# Patient Record
Sex: MALE | Race: WHITE | NOT HISPANIC OR LATINO | URBAN - METROPOLITAN AREA
[De-identification: names, ages, dates, MRNs, and addresses within clinical notes are randomized per-mention and may not be internally consistent; named-entity substitution may affect disease eponyms.]

---

## 2017-09-26 ENCOUNTER — EMERGENCY (EMERGENCY)
Facility: HOSPITAL | Age: 32
LOS: 0 days | Discharge: HOME | End: 2017-09-27

## 2017-09-26 DIAGNOSIS — R10.9 UNSPECIFIED ABDOMINAL PAIN: ICD-10-CM

## 2017-09-26 DIAGNOSIS — N13.30 UNSPECIFIED HYDRONEPHROSIS: ICD-10-CM

## 2017-09-26 DIAGNOSIS — N20.1 CALCULUS OF URETER: ICD-10-CM

## 2017-12-07 ENCOUNTER — EMERGENCY (EMERGENCY)
Facility: HOSPITAL | Age: 32
LOS: 0 days | Discharge: HOME | End: 2017-12-07
Admitting: INTERNAL MEDICINE

## 2017-12-07 DIAGNOSIS — R05 COUGH: ICD-10-CM

## 2018-07-11 ENCOUNTER — INPATIENT (INPATIENT)
Facility: HOSPITAL | Age: 33
LOS: 1 days | Discharge: HOME | End: 2018-07-13
Attending: INTERNAL MEDICINE | Admitting: INTERNAL MEDICINE
Payer: COMMERCIAL

## 2018-07-11 VITALS
RESPIRATION RATE: 20 BRPM | WEIGHT: 289.91 LBS | TEMPERATURE: 101 F | SYSTOLIC BLOOD PRESSURE: 153 MMHG | HEART RATE: 119 BPM | DIASTOLIC BLOOD PRESSURE: 85 MMHG | OXYGEN SATURATION: 96 %

## 2018-07-11 LAB
ANION GAP SERPL CALC-SCNC: 12 MMOL/L — SIGNIFICANT CHANGE UP (ref 7–14)
BASOPHILS # BLD AUTO: 0.03 K/UL — SIGNIFICANT CHANGE UP (ref 0–0.2)
BASOPHILS NFR BLD AUTO: 0.3 % — SIGNIFICANT CHANGE UP (ref 0–1)
BUN SERPL-MCNC: 13 MG/DL — SIGNIFICANT CHANGE UP (ref 10–20)
CALCIUM SERPL-MCNC: 8.4 MG/DL — LOW (ref 8.5–10.1)
CHLORIDE SERPL-SCNC: 106 MMOL/L — SIGNIFICANT CHANGE UP (ref 98–110)
CO2 SERPL-SCNC: 22 MMOL/L — SIGNIFICANT CHANGE UP (ref 17–32)
CREAT SERPL-MCNC: 1.1 MG/DL — SIGNIFICANT CHANGE UP (ref 0.7–1.5)
EOSINOPHIL # BLD AUTO: 0.07 K/UL — SIGNIFICANT CHANGE UP (ref 0–0.7)
EOSINOPHIL NFR BLD AUTO: 0.6 % — SIGNIFICANT CHANGE UP (ref 0–8)
GAS PNL BLDV: SIGNIFICANT CHANGE UP
GLUCOSE SERPL-MCNC: 105 MG/DL — HIGH (ref 70–99)
HCT VFR BLD CALC: 46.2 % — SIGNIFICANT CHANGE UP (ref 42–52)
HGB BLD-MCNC: 15.3 G/DL — SIGNIFICANT CHANGE UP (ref 14–18)
IMM GRANULOCYTES NFR BLD AUTO: 0.6 % — HIGH (ref 0.1–0.3)
LYMPHOCYTES # BLD AUTO: 1.81 K/UL — SIGNIFICANT CHANGE UP (ref 1.2–3.4)
LYMPHOCYTES # BLD AUTO: 15.8 % — LOW (ref 20.5–51.1)
MCHC RBC-ENTMCNC: 27 PG — SIGNIFICANT CHANGE UP (ref 27–31)
MCHC RBC-ENTMCNC: 33.1 G/DL — SIGNIFICANT CHANGE UP (ref 32–37)
MCV RBC AUTO: 81.5 FL — SIGNIFICANT CHANGE UP (ref 80–94)
MONOCYTES # BLD AUTO: 2.43 K/UL — HIGH (ref 0.1–0.6)
MONOCYTES NFR BLD AUTO: 21.3 % — HIGH (ref 1.7–9.3)
NEUTROPHILS # BLD AUTO: 7.02 K/UL — HIGH (ref 1.4–6.5)
NEUTROPHILS NFR BLD AUTO: 61.4 % — SIGNIFICANT CHANGE UP (ref 42.2–75.2)
PLATELET # BLD AUTO: 150 K/UL — SIGNIFICANT CHANGE UP (ref 130–400)
POTASSIUM SERPL-MCNC: 3.6 MMOL/L — SIGNIFICANT CHANGE UP (ref 3.5–5)
POTASSIUM SERPL-SCNC: 3.6 MMOL/L — SIGNIFICANT CHANGE UP (ref 3.5–5)
RBC # BLD: 5.67 M/UL — SIGNIFICANT CHANGE UP (ref 4.7–6.1)
RBC # FLD: 13.1 % — SIGNIFICANT CHANGE UP (ref 11.5–14.5)
SODIUM SERPL-SCNC: 140 MMOL/L — SIGNIFICANT CHANGE UP (ref 135–146)
WBC # BLD: 11.43 K/UL — HIGH (ref 4.8–10.8)
WBC # FLD AUTO: 11.43 K/UL — HIGH (ref 4.8–10.8)

## 2018-07-11 RX ORDER — HYDROMORPHONE HYDROCHLORIDE 2 MG/ML
2 INJECTION INTRAMUSCULAR; INTRAVENOUS; SUBCUTANEOUS ONCE
Qty: 0 | Refills: 0 | Status: DISCONTINUED | OUTPATIENT
Start: 2018-07-11 | End: 2018-07-11

## 2018-07-11 RX ORDER — OXYCODONE HYDROCHLORIDE 5 MG/1
5 TABLET ORAL ONCE
Qty: 0 | Refills: 0 | Status: DISCONTINUED | OUTPATIENT
Start: 2018-07-11 | End: 2018-07-11

## 2018-07-11 RX ORDER — ACETAMINOPHEN 500 MG
650 TABLET ORAL ONCE
Qty: 0 | Refills: 0 | Status: COMPLETED | OUTPATIENT
Start: 2018-07-11 | End: 2018-07-11

## 2018-07-11 RX ORDER — OXYCODONE AND ACETAMINOPHEN 5; 325 MG/1; MG/1
1 TABLET ORAL EVERY 4 HOURS
Qty: 0 | Refills: 0 | Status: DISCONTINUED | OUTPATIENT
Start: 2018-07-11 | End: 2018-07-12

## 2018-07-11 RX ORDER — ACETAMINOPHEN 500 MG
650 TABLET ORAL EVERY 6 HOURS
Qty: 0 | Refills: 0 | Status: DISCONTINUED | OUTPATIENT
Start: 2018-07-11 | End: 2018-07-12

## 2018-07-11 RX ORDER — CIPROFLOXACIN LACTATE 400MG/40ML
400 VIAL (ML) INTRAVENOUS ONCE
Qty: 0 | Refills: 0 | Status: COMPLETED | OUTPATIENT
Start: 2018-07-11 | End: 2018-07-11

## 2018-07-11 RX ORDER — CIPROFLOXACIN HCL 0.3 %
1 DROPS OPHTHALMIC (EYE) ONCE
Qty: 0 | Refills: 0 | Status: COMPLETED | OUTPATIENT
Start: 2018-07-11 | End: 2018-07-11

## 2018-07-11 RX ORDER — HYDROMORPHONE HYDROCHLORIDE 2 MG/ML
0.5 INJECTION INTRAMUSCULAR; INTRAVENOUS; SUBCUTANEOUS ONCE
Qty: 0 | Refills: 0 | Status: DISCONTINUED | OUTPATIENT
Start: 2018-07-11 | End: 2018-07-11

## 2018-07-11 RX ORDER — ACETAMINOPHEN 500 MG
650 TABLET ORAL EVERY 4 HOURS
Qty: 0 | Refills: 0 | Status: DISCONTINUED | OUTPATIENT
Start: 2018-07-11 | End: 2018-07-11

## 2018-07-11 RX ORDER — KETOROLAC TROMETHAMINE 30 MG/ML
15 SYRINGE (ML) INJECTION ONCE
Qty: 0 | Refills: 0 | Status: DISCONTINUED | OUTPATIENT
Start: 2018-07-11 | End: 2018-07-11

## 2018-07-11 RX ORDER — CEFEPIME 1 G/1
2000 INJECTION, POWDER, FOR SOLUTION INTRAMUSCULAR; INTRAVENOUS EVERY 12 HOURS
Qty: 0 | Refills: 0 | Status: DISCONTINUED | OUTPATIENT
Start: 2018-07-11 | End: 2018-07-12

## 2018-07-11 RX ORDER — OXYCODONE AND ACETAMINOPHEN 5; 325 MG/1; MG/1
1 TABLET ORAL ONCE
Qty: 0 | Refills: 0 | Status: DISCONTINUED | OUTPATIENT
Start: 2018-07-11 | End: 2018-07-11

## 2018-07-11 RX ORDER — SODIUM CHLORIDE 9 MG/ML
1000 INJECTION INTRAMUSCULAR; INTRAVENOUS; SUBCUTANEOUS ONCE
Qty: 0 | Refills: 0 | Status: COMPLETED | OUTPATIENT
Start: 2018-07-11 | End: 2018-07-11

## 2018-07-11 RX ADMIN — Medication 200 MILLIGRAM(S): at 05:26

## 2018-07-11 RX ADMIN — OXYCODONE HYDROCHLORIDE 5 MILLIGRAM(S): 5 TABLET ORAL at 18:36

## 2018-07-11 RX ADMIN — OXYCODONE HYDROCHLORIDE 5 MILLIGRAM(S): 5 TABLET ORAL at 19:00

## 2018-07-11 RX ADMIN — OXYCODONE AND ACETAMINOPHEN 1 TABLET(S): 5; 325 TABLET ORAL at 06:39

## 2018-07-11 RX ADMIN — OXYCODONE AND ACETAMINOPHEN 1 TABLET(S): 5; 325 TABLET ORAL at 15:53

## 2018-07-11 RX ADMIN — Medication 15 MILLIGRAM(S): at 16:53

## 2018-07-11 RX ADMIN — CEFEPIME 100 MILLIGRAM(S): 1 INJECTION, POWDER, FOR SOLUTION INTRAMUSCULAR; INTRAVENOUS at 18:00

## 2018-07-11 RX ADMIN — OXYCODONE AND ACETAMINOPHEN 1 TABLET(S): 5; 325 TABLET ORAL at 18:24

## 2018-07-11 RX ADMIN — SODIUM CHLORIDE 1000 MILLILITER(S): 9 INJECTION INTRAMUSCULAR; INTRAVENOUS; SUBCUTANEOUS at 03:34

## 2018-07-11 RX ADMIN — OXYCODONE AND ACETAMINOPHEN 1 TABLET(S): 5; 325 TABLET ORAL at 16:52

## 2018-07-11 RX ADMIN — Medication 650 MILLIGRAM(S): at 14:30

## 2018-07-11 RX ADMIN — Medication 650 MILLIGRAM(S): at 23:00

## 2018-07-11 RX ADMIN — Medication 650 MILLIGRAM(S): at 15:00

## 2018-07-11 RX ADMIN — Medication 15 MILLIGRAM(S): at 05:18

## 2018-07-11 RX ADMIN — HYDROMORPHONE HYDROCHLORIDE 0.5 MILLIGRAM(S): 2 INJECTION INTRAMUSCULAR; INTRAVENOUS; SUBCUTANEOUS at 23:54

## 2018-07-11 RX ADMIN — Medication 1 DROP(S): at 14:39

## 2018-07-11 RX ADMIN — Medication 15 MILLIGRAM(S): at 03:43

## 2018-07-11 NOTE — H&P ADULT - NSHPSOCIALHISTORY_GEN_ALL_CORE
Marital Status:  (   )    (  x ) Single    (   )    (  )   Occupation:   Lives with: (  ) alone  (  ) children   (  ) spouse   ( x ) parents  (  ) other  Recent Travel: None    Substance Use (street drugs): ( x ) never used  (  ) other:  Tobacco Usage:  (  x ) never smoked   (   ) former smoker   (   ) current smoker  (     ) pack year  Alcohol Usage: Denied

## 2018-07-11 NOTE — ED PROVIDER NOTE - PHYSICAL EXAMINATION
Vital Signs: I have reviewed the initial vital signs.  Constitutional: NAD, well-nourished, appears stated age, no acute distress.  Head: NCAT  Eyes: Anicteric  ENT: + right ear with edema/ttp of the uricle. TEENT: Airway patent, moist MM, no erythema/swelling/deformity of oral structures. EOMI, PERRLA.  CV: regular rate, regular rhythm, well-perfused extremities, 2+ b/l DP and radial pulses equal.  Lungs: BCTA, no increased WOB.  ABD: NTND, no guarding or rebound, no pulsatile mass, no hernias.   MSK: Neck supple, nontender, nl ROM, no stepoff. Chest nontender. Back nontender in TLS spine or to b/l bony structures or flanks. Ext nontender, nl rom, no deformity.   INTEG: Skin warm, dry, no rash.  NEURO: A&Ox3, CN II-XII intact, normal strength 5/5 all 4 ext, nl sensation throughout, normal speech and coordination.  PSYCH: Calm, cooperative, normal affect and interaction. Vital Signs: I have reviewed the initial vital signs.  Constitutional: NAD, well-nourished, appears stated age, no acute distress.  Head: NCAT  Eyes: Anicteric  ENT: + right ear with edema/ttp of the auricle, tragi, mastoid. Edemetous external canal. Unable to visualized TM 2/2 edemetous canal. Left ear WNL. MMM  CV: RRR  Lungs: Unlabored respirations  ABD: ND  MSK: Neck supple. No rigidity  INTEG: Skin warm, dry, no rash.  NEURO: A&Ox3, CN II-XII intact, normal strength 5/5 all 4 ext, nl sensation throughout, normal speech and coordination.  PSYCH: Calm, cooperative, normal affect and interaction.

## 2018-07-11 NOTE — ED ADULT NURSE NOTE - CHIEF COMPLAINT QUOTE
I was at [facility] yesterday for an ear infection, the pain is bad and the Abx isn't doing anything - patient

## 2018-07-11 NOTE — ED ADULT NURSE REASSESSMENT NOTE - NS ED NURSE REASSESS COMMENT FT1
Pt presented to R ear pain and fever. He was seen in the [facility] but the fever started last night. Pt states the pain radiates to his R side of his face. Labs were done and pt was given pain medication

## 2018-07-11 NOTE — H&P ADULT - HISTORY OF PRESENT ILLNESS
32M with no sig pmhx presents from home for worsening right sided ear pain, drainage, and swelling. Patient has noticed his ear has been itching for months, was using a q-tip to scratch it. 3 days ago patient was seen at [facility] for similar complaints and was diagnosed with otitis externa and given Augmentin, Cipro otic drops, and toradol for pain. Patient was compliant with medications at home but continue to experience significant pain, swelling, and otic discharge (brown) and so came back in the ED. Patient reports tmax at home of 103.8. Endorses swimming in his pool. No SOB, CP, Confusion, neck pain, n/v/d.   In ED CT scan revealed R sided otitis externa with  otomastoiditis w/o evidence of erosion or coalescence. Given Cipor otic drop and Cipro IV

## 2018-07-11 NOTE — ED PROVIDER NOTE - NS ED ROS FT
Constitutional: See HPI  Eyes/ENT: See HPI  Cardiovascular: (-) chest pain, (-) syncope  Respiratory: (-) cough  Gastrointestinal: (-) n/v  Neurological: (-) headache Constitutional: See HPI  Eyes/ENT: See HPI  Cardiovascular: (-) chest pain, (-) syncope  Respiratory: (-) cough  Gastrointestinal: (-) n/v  Neurological: (-) headache  ROS negative other than as documented in HPI

## 2018-07-11 NOTE — ED ADULT TRIAGE NOTE - CHIEF COMPLAINT QUOTE
I was at [facility] yesterday for an ear infection, the pain is bad and the Abx isn't doing anything - patient

## 2018-07-11 NOTE — H&P ADULT - NSHPPHYSICALEXAM_GEN_ALL_CORE
Vital Signs Last 24 Hrs  T(C): 36.6 (11 Jul 2018 08:10), Max: 38.5 (11 Jul 2018 02:15)  T(F): 97.9 (11 Jul 2018 08:10), Max: 101.3 (11 Jul 2018 02:15)  HR: 79 (11 Jul 2018 08:10) (79 - 119)  BP: 127/60 (11 Jul 2018 08:10) (110/57 - 153/85)  RR: 18 (11 Jul 2018 08:10) (18 - 20)  SpO2: 97% (11 Jul 2018 08:10) (96% - 97%)      GENERAL: Mild Distress, Non-toxic, stated age   HEAD:  R ear swelling with purulent discharge, otic wick in place, swelling anteriorly to the ear s noted. Minimal mastoid tenderness .   EYES: EOMI, PERRLA, conjunctiva and sclera clear  NECK: Supple, R submandibular LAD noted, tenderness improved   CHEST/LUNG: Clear to auscultation bilaterally; No wheezing, rhonchi, or crackles  HEART: Regular rate and rhythm; s1, s2, No murmurs, rubs, or gallops  ABDOMEN: Soft, Nontender, Nondistended; Bowel sounds present, No rebound or guarding noted   EXTREMITIES:  No lower extremity edema or calf tenderness to palpation.  No clubbing or cyanosis  PSYCH: AAOx3  NEUROLOGY: non-focal  SKIN: small petichial rash noted on Right ankle

## 2018-07-11 NOTE — H&P ADULT - ASSESSMENT
32M with no sig pmhx presents from home for worsening right sided ear pain, drainage, and swelling. Found to have otitis externa and mastoiditis     Otitis Externa and Mastoiditis:   Will start Cefepime for psudeomonal coverage (swimming in pool and small ankle rash)  ID Consult  ENT consult  Toradol and APAP for pain control     GI ppx: Not indicated  DVT ppx: Low risk, patient can ambulate well and is young  Full Code  Regular diet 32M with no sig pmhx presents from home for worsening right sided ear pain, drainage, and swelling. Found to have otitis externa and mastoiditis     Otitis Externa and Mastoiditis:   Will start Cefepime for psudeomonal coverage (swimming in pool and small ankle rash)  ID Consult  ENT consult  f/u blood and ear culture  Toradol and APAP for pain control     GI ppx: Not indicated  DVT ppx: Low risk, patient can ambulate well and is young  Full Code  Regular diet

## 2018-07-11 NOTE — H&P ADULT - NSHPLABSRESULTS_GEN_ALL_CORE
15.3   11.43 )-----------( 150      ( 11 Jul 2018 03:00 )             46.2       07-11    140  |  106  |  13  ----------------------------<  105<H>  3.6   |  22  |  1.1    Ca    8.4<L>      11 Jul 2018 03:52

## 2018-07-11 NOTE — ED PROVIDER NOTE - PROGRESS NOTE DETAILS
Labs reassuring. Pain controlled after percocet. Patient given cipro for malignant otitis externa. Ear wick placed. Patient signed out to day team to f/u CT results. If CT without mastoiditis, will like discharge patient on cipro 750mg BID x 10 days for CLARK. Patient has f/u with ENT today endorsed to Dr Perez, pending CT scan and dispo. Pt signed out to me by VETO Cormier. Will f/u CT scan read. Will advise pt to continue Cipro drops and will give Cipro PO. Pt had ear wick placed in ED. Spoke w/ MAR. Requesting ENT consult. ENT paged, awaiting callback. Will admit pt to medicine for IV abx. Pt agreeable to plan. PMD is [facility] group. Dr. Ashley called back from [facility]'s group - accepts admission. Recommending ENT & ID consults.

## 2018-07-11 NOTE — ED PROVIDER NOTE - OBJECTIVE STATEMENT
33 y/o M with no significant PMH who presents with worsening right ear pain. He has associated fevers to 103.8 and purulent drainage. He was seen at [facility] where he was given toradol and discharged with augmentin, cipro otic, and tylenol. Denies HA, dizziness, DM, other URI sxs, rash, trauma, other complaints  He has f/u with ENT today.

## 2018-07-11 NOTE — ED PROVIDER NOTE - ATTENDING CONTRIBUTION TO CARE
33 yo male with no PMH presents to the ER 31 yo male with no PMH presents to the ER for right ear pain 31 yo male with no PMH presents to the ER for right ear pain x 2 days which pt states is worsening. Pt states he was placed on Cipro otic and augmentin but the swelling to left is worsening. No wick placed in the ear at the time he was in seen 2 days ago at  [facility]. Pt states he has high fevers up to 103.8 with drainage, and pain that is spreading from canal to pinna with some tenderness over mastoid bone on PA evaluation. NO neck stiffness, no HA, no vomiting, no diarrhea, no other complaints. Will check labs, CT and place wick into ear canal for cipro otic. Will also give dose of CIpro IV in ER to cover for malignant otitis externa. To reassess. 31 yo male with no PMH presents to the ER for right ear pain x 2 days which pt states is worsening. Pt states he was placed on Cipro otic and augmentin but the swelling to left is worsening. No wick placed in the ear at the time he was in seen 2 days ago at  [facility]. Pt states he has high fevers up to 103.8 with drainage, and pain that is spreading from canal to pinna with some tenderness over mastoid bone on PA evaluation. NO neck stiffness, no HA, no vomiting, no diarrhea, no other complaints. Will check labs, CT and place wick into ear canal for cipro otic. Will also give dose of CIpro IV in ER to cover for malignant otitis externa. To reassess.    Dr. Perez @09:00 - Patient's CT read as acute mastoiditis. Will start IV abx, admit for IV abx and ENT evaluation.

## 2018-07-11 NOTE — H&P ADULT - ATTENDING COMMENTS
Pt seen and examined, I have read and agree with above exam and poa,   c/o pain. discharge r ear  Assessment:  · Assessment    32M with no sig pmhx presents from home for worsening right sided ear pain, drainage, and swelling. Found to have otitis externa and mastoiditis     Otitis Externa and Mastoiditis:   Will start Cefepime for psudeomonal coverage (swimming in pool and small ankle rash)  ID Consult  ENT consult  f/u blood and ear culture  Toradol and APAP for pain control     GI ppx: Not indicated  DVT ppx: Low risk, patient can ambulate well and is young  Full Code  Regular diet

## 2018-07-12 DIAGNOSIS — S83.519A SPRAIN OF ANTERIOR CRUCIATE LIGAMENT OF UNSPECIFIED KNEE, INITIAL ENCOUNTER: Chronic | ICD-10-CM

## 2018-07-12 LAB
ANION GAP SERPL CALC-SCNC: 15 MMOL/L — HIGH (ref 7–14)
BUN SERPL-MCNC: 19 MG/DL — SIGNIFICANT CHANGE UP (ref 10–20)
CALCIUM SERPL-MCNC: 9.1 MG/DL — SIGNIFICANT CHANGE UP (ref 8.5–10.1)
CHLORIDE SERPL-SCNC: 105 MMOL/L — SIGNIFICANT CHANGE UP (ref 98–110)
CO2 SERPL-SCNC: 25 MMOL/L — SIGNIFICANT CHANGE UP (ref 17–32)
CREAT SERPL-MCNC: 1.1 MG/DL — SIGNIFICANT CHANGE UP (ref 0.7–1.5)
GLUCOSE SERPL-MCNC: 100 MG/DL — HIGH (ref 70–99)
MAGNESIUM SERPL-MCNC: 2.2 MG/DL — SIGNIFICANT CHANGE UP (ref 1.8–2.4)
POTASSIUM SERPL-MCNC: 4.1 MMOL/L — SIGNIFICANT CHANGE UP (ref 3.5–5)
POTASSIUM SERPL-SCNC: 4.1 MMOL/L — SIGNIFICANT CHANGE UP (ref 3.5–5)
SODIUM SERPL-SCNC: 145 MMOL/L — SIGNIFICANT CHANGE UP (ref 135–146)

## 2018-07-12 PROCEDURE — 99222 1ST HOSP IP/OBS MODERATE 55: CPT

## 2018-07-12 RX ORDER — CEFEPIME 1 G/1
2000 INJECTION, POWDER, FOR SOLUTION INTRAMUSCULAR; INTRAVENOUS EVERY 8 HOURS
Qty: 0 | Refills: 0 | Status: DISCONTINUED | OUTPATIENT
Start: 2018-07-12 | End: 2018-07-13

## 2018-07-12 RX ORDER — PANTOPRAZOLE SODIUM 20 MG/1
40 TABLET, DELAYED RELEASE ORAL EVERY 24 HOURS
Qty: 0 | Refills: 0 | Status: DISCONTINUED | OUTPATIENT
Start: 2018-07-12 | End: 2018-07-13

## 2018-07-12 RX ORDER — OXYCODONE AND ACETAMINOPHEN 5; 325 MG/1; MG/1
1 TABLET ORAL EVERY 6 HOURS
Qty: 0 | Refills: 0 | Status: DISCONTINUED | OUTPATIENT
Start: 2018-07-12 | End: 2018-07-13

## 2018-07-12 RX ADMIN — CEFEPIME 100 MILLIGRAM(S): 1 INJECTION, POWDER, FOR SOLUTION INTRAMUSCULAR; INTRAVENOUS at 06:15

## 2018-07-12 RX ADMIN — CEFEPIME 100 MILLIGRAM(S): 1 INJECTION, POWDER, FOR SOLUTION INTRAMUSCULAR; INTRAVENOUS at 21:59

## 2018-07-12 RX ADMIN — Medication 500 MILLIGRAM(S): at 18:00

## 2018-07-12 RX ADMIN — OXYCODONE AND ACETAMINOPHEN 1 TABLET(S): 5; 325 TABLET ORAL at 15:28

## 2018-07-12 RX ADMIN — Medication 40 MILLIGRAM(S): at 17:18

## 2018-07-12 RX ADMIN — PANTOPRAZOLE SODIUM 40 MILLIGRAM(S): 20 TABLET, DELAYED RELEASE ORAL at 14:05

## 2018-07-12 RX ADMIN — HYDROMORPHONE HYDROCHLORIDE 0.5 MILLIGRAM(S): 2 INJECTION INTRAMUSCULAR; INTRAVENOUS; SUBCUTANEOUS at 00:47

## 2018-07-12 RX ADMIN — Medication 500 MILLIGRAM(S): at 17:18

## 2018-07-12 RX ADMIN — OXYCODONE AND ACETAMINOPHEN 1 TABLET(S): 5; 325 TABLET ORAL at 06:17

## 2018-07-12 RX ADMIN — OXYCODONE AND ACETAMINOPHEN 1 TABLET(S): 5; 325 TABLET ORAL at 14:30

## 2018-07-12 RX ADMIN — OXYCODONE AND ACETAMINOPHEN 1 TABLET(S): 5; 325 TABLET ORAL at 07:16

## 2018-07-12 RX ADMIN — Medication 650 MILLIGRAM(S): at 00:47

## 2018-07-12 NOTE — CONSULT NOTE ADULT - ENMT COMMENTS
Right otitis, no otorrhea. No mastoid pain. R ear canal could not be visualised. Mild erythema of ear. No tenderness. R mastoid with no pain, edema/ erythema/ induration.

## 2018-07-12 NOTE — CONSULT NOTE ADULT - SUBJECTIVE AND OBJECTIVE BOX
ENT DAILY PROGRESS NOTE    Overnight events/Interval HPI: HPI:  32M with no sig pmhx presents from home for worsening right sided ear pain, drainage, and swelling. Patient has noticed his ear has been itching for months, was using a q-tip to scratch it. 3 days ago patient was seen at [facility] for similar complaints and was diagnosed with otitis externa and given Augmentin, Cipro otic drops, and toradol for pain. Patient was compliant with medications at home but continue to experience significant pain, swelling, and otic discharge (brown) and so came back in the ED. Patient reports tmax at home of 103.8. Endorses swimming in his pool. No SOB, CP, Confusion, neck pain, n/v/d.   In ED CT scan revealed R sided otitis externa with  otomastoiditis w/o evidence of erosion or coalescence. Given Cipor otic drop and Cipro IV (11 Jul 2018 11:59)  Ear wick in place. pt still experiencing pain. + yellow discharge from R ear. TM not visualized due to edema.        Allergies    morphine (Rash)    Intolerances        MEDICATIONS:  Antiinfectives:   cefepime   IVPB 2000 milliGRAM(s) IV Intermittent every 12 hours    IV fluids:    Hematologic/Anticoagulation:    Pain medications/Neuro:  acetaminophen   Tablet. 650 milliGRAM(s) Oral every 4 hours PRN    Endocrine Medications:     All other standing medications:   ciprofloxacin  0.3% Otic Solution 1 Drop(s) Right Ear Once    All other PRN medications:      Vital Signs Last 24 Hrs  T(C): 36.6 (11 Jul 2018 08:10), Max: 38.5 (11 Jul 2018 02:15)  T(F): 97.9 (11 Jul 2018 08:10), Max: 101.3 (11 Jul 2018 02:15)  HR: 79 (11 Jul 2018 08:10) (79 - 119)  BP: 127/60 (11 Jul 2018 08:10) (110/57 - 153/85)  BP(mean): --  RR: 18 (11 Jul 2018 08:10) (18 - 20)  SpO2: 97% (11 Jul 2018 08:10) (96% - 97%)          PHYSICAL EXAM:    ENT EXAM-   Constitutional: Well-developed, well-nourished.  No hoarseness.   awake/alert  Head:  normocephalic, atraumatic.   Ears:  L eac clear, no edema no erythema. R pinna edematous and TTP. EAC edematous, R TM not visualized due to edema and ear wick in place. No mastoid tenderness.  Nose:  Septum intact, midline, deviated.  Inferior turbinates normal bilateral  OC/OP:  Floor of mouth, buccal mucosa, lips, hard palate, soft palate, uvula, posterior pharyngeal wall normal.  Mucosa moist.  Neck:  FROM, No ttp, supple    CBC-                        15.3   11.43 )-----------( 150      ( 11 Jul 2018 03:00 )             46.2     BMP/CMP-  11 Jul 2018 03:52    140    |  106    |  13     ----------------------------<  105    3.6     |  22     |  1.1      Ca    8.4        11 Jul 2018 03:52      Coagulation Studies-    Endocrine Panel-  Calcium, Total Serum: 8.4 mg/dL (07-11 @ 03:52)              RADIOLOGY & ADDITIONAL STUDIES:    < from: CT Orbit w/ IV Cont (07.11.18 @ 05:12) >    EXAM:  CT ORBITS IC            PROCEDURE DATE:  07/11/2018            INTERPRETATION:  Clinical History / Reason for exam: Clinical suspicion   for mastoiditis.    Technique: Multiple axial CT images of the temporal bones was performed   before andafter the administration of 100 cc Optiray 320 intravenous   contrast.    No comparisons are available.    Findings:    Right temporal bone: There is significant soft tissue thickening   involving the right external auditory canal. No fluid collection or bony   erosion is seen. Soft tissue attenuation material fills the right   external auditory canal to the tympanic membrane. No definite adjacent   bony erosion is seen.    There is partial opacification of the middle ear cavity. The ossicles are  intact. Fluid with fluid/fluid levels is seen in the right mastoid air   cells. There is no bony erosion or evidence of coalescence. The internal   auditory canal, cochlea, semicircular canals and the facial canal are   normal in appearance.    Left temporal bone: The external auditory canal is normal in appearance.   The middle ear cavity and the mastoid air cells are patent without   opacification. The ossicles are intact. The internal auditory canal,   cochlea, semicircular canal and the facial canal are normal in appearance.    The visualized dural sinuses are patent.    Impression:    Right temporal bone: Findings compatible with otitis externa and   otomastoiditis without osseous erosion/coalescence.    Left temporal bone: Unremarkable evaluation of the left temporal bone.                  SHABNAM OBREGON M.D., ATTENDING RADIOLOGIST  This document has been electronically signed. Jul 11 2018  8:32AM                < end of copied text >
KE GUARDADO  32y, Male  Allergy: morphine (Rash)      HPI:  32M with no sig pmhx presents from home for worsening right sided ear pain, drainage, and swelling. Patient has noticed his ear has been itching for months, was using a q-tip to scratch it. 3 days ago patient was seen at [facility] for similar complaints and was diagnosed with otitis externa and given Augmentin, Cipro otic drops, and toradol for pain. Patient was compliant with medications at home but continue to experience significant pain, swelling, and otic discharge (brown) and so came back in the ED. Patient reports tmax at home of 103.8. Endorses swimming in his pool. No SOB, CP, Confusion, neck pain, n/v/d.   In ED CT scan revealed R sided otitis externa with  otomastoiditis w/o evidence of erosion or coalescence. Given Cipor otic drop and Cipro IV (11 Jul 2018 11:59)    FAMILY HISTORY:  No pertinent family history in first degree relatives    PAST MEDICAL & SURGICAL HISTORY:  No pertinent past medical history  ACL (anterior cruciate ligament) tear: Left ACl torn        VITALS:  T(F): 97.5, Max: 98.5 (07-11-18 @ 16:04)  HR: 80  BP: 172/85  RR: 18Vital Signs Last 24 Hrs  T(C): 36.4 (12 Jul 2018 12:48), Max: 36.9 (11 Jul 2018 16:04)  T(F): 97.5 (12 Jul 2018 12:48), Max: 98.5 (11 Jul 2018 16:04)  HR: 80 (12 Jul 2018 12:48) (80 - 90)  BP: 172/85 (12 Jul 2018 12:48) (121/68 - 172/85)  BP(mean): --  RR: 18 (12 Jul 2018 12:48) (18 - 20)  SpO2: 98% (11 Jul 2018 22:39) (98% - 98%)    TESTS & MEASUREMENTS:                        15.3   11.43 )-----------( 150      ( 11 Jul 2018 03:00 )             46.2     07-12    145  |  105  |  19  ----------------------------<  100<H>  4.1   |  25  |  1.1    Ca    9.1      12 Jul 2018 08:27  Mg     2.2     07-12                RADIOLOGY & ADDITIONAL TESTS:    ANTIBIOTICS:  cefepime   IVPB 2000 milliGRAM(s) IV Intermittent every 12 hours

## 2018-07-12 NOTE — PROGRESS NOTE ADULT - ASSESSMENT
ADMISSION SUMMARY  The patient is a 31 yo M with no significant past medical history presented from home for worsening right sided ear pain. He states that he noticed his ear had been itching for several months and that he noticed a brownish discharge on his pillow in the morning, in the location of where the right side of his head had been. 3 days PTP, he had been admitted to [facility] for the aforementioned reasons and was given Augmentin and ciprofloxacin otic drops (3 drops in right ear q12h). He states he was compliant with the medications at home but continued to experience the pain, so he presented to the ED here. Just PTP, the patient had a temperature of 103.8 F. He states that 6 days PTP, he had swum in his swimming pool. He denies SOB, CP, confusion, neck pain, nausea and vomiting. A CT scan revealed right sided otitis externa with otomastoiditis witout evidence of erosion or coalescence. He was given cipro IV and cefepime. On 7/12 AM, the patient was given 0.5 mg of Dilaudid just after midnight for severe pain in the right ear. The region was not tender to palpation, but appeared slightly swollen. He was tolerating his diet and ambulating without assistance.        ASSESSMENT & PLAN    1. Otitis externa and otomastoiditis  Per ID, cefepime 2 gm IV q12h.  Per ENT, 5 cipro drops to right ear q12h.  C/w ear wick.  Pain control w/ naproxen 500 mg q12h and percocet q6h, PRN for breakthrough pain.   Monitor vitals.  F/u CBC and BMP.    2. GI ppx  Protonix 40 mg q24h.    3. DVT ppx  Ambulate as tolerated.    4. C/w regular diet.    5. Dispo  Full code. Patient from home.          (x ) Discussion with patient and/or family regarding goals of care  (x ) Discussed Case and Plan with Medical Attending, Name: Dr. Lundberg ADMISSION SUMMARY  The patient is a 33 yo M with no significant past medical history presented from home for worsening right sided ear pain. He states that he noticed his ear had been itching for several months and that he noticed a brownish discharge on his pillow in the morning, in the location of where the right side of his head had been. 3 days PTP, he had been admitted to [facility] for the aforementioned reasons and was given Augmentin and ciprofloxacin otic drops (3 drops in right ear q12h). He states he was compliant with the medications at home but continued to experience the pain, so he presented to the ED here. Just PTP, the patient had a temperature of 103.8 F. He states that 6 days PTP, he had swum in his swimming pool. He denies SOB, CP, confusion, neck pain, nausea and vomiting. A CT scan revealed right sided otitis externa with otomastoiditis witout evidence of erosion or coalescence. He was given cipro IV and cefepime. On 7/12 AM, the patient was given 0.5 mg of Dilaudid just after midnight for severe pain in the right ear. The region was not tender to palpation, but appeared slightly swollen. He was tolerating his diet and ambulating without assistance.        ASSESSMENT & PLAN    1. Otitis externa and otomastoiditis  Per ID, cefepime 2 gm IV q8h.  Prednisone 40 mg PO q24.  Per ENT, 5 cipro drops to right ear q12h.  C/w ear wick.  Pain control w/ naproxen 500 mg q12h and percocet q6h, PRN for breakthrough pain.   Monitor vitals.  F/u CBC and BMP.  F/u blood cx.    2. GI ppx  Protonix 40 mg q24h.    3. DVT ppx  Ambulate as tolerated.    4. C/w regular diet.    5. Dispo  Full code. Patient from home.          (x ) Discussion with patient and/or family regarding goals of care  (x ) Discussed Case and Plan with Medical Attending, Name: Dr. Lundberg

## 2018-07-12 NOTE — CONSULT NOTE ADULT - ASSESSMENT
IMPRESSION:  Right acute otitis externa.  Mastoiditis not that impressive clinically with no evidence of bony erosion.    RECOMMENDATIONS:  Prednisone 40 mg oo q24h.  Cefepime 2 gm iv q8h.
33 y/o male with otitis externa  - R Ear wick  - floxin drops to R ear, 5 drops Q 12  - pain control  - will d/w attending

## 2018-07-12 NOTE — PROGRESS NOTE ADULT - SUBJECTIVE AND OBJECTIVE BOX
Patient was seen and examined. Spoke with RN. Chart reviewed.  No events overnight. Overall improving, no fever  Vital Signs Last 24 Hrs  T(F): 97.8 (12 Jul 2018 05:06), Max: 98.5 (11 Jul 2018 16:04)  HR: 90 (12 Jul 2018 05:06) (79 - 90)  BP: 133/87 (12 Jul 2018 05:06) (121/68 - 133/87)  SpO2: 98% (11 Jul 2018 22:39) (97% - 98%)  MEDICATIONS  (STANDING):  cefepime   IVPB 2000 milliGRAM(s) IV Intermittent every 12 hours    MEDICATIONS  (PRN):  acetaminophen   Tablet. 650 milliGRAM(s) Oral every 6 hours PRN Severe Pain (7 - 10)  oxyCODONE    5 mG/acetaminophen 325 mG 1 Tablet(s) Oral every 4 hours PRN Severe Pain (7 - 10)    Labs:                        15.3   11.43 )-----------( 150      ( 11 Jul 2018 03:00 )             46.2     11 Jul 2018 03:52    140    |  106    |  13     ----------------------------<  105    3.6     |  22     |  1.1      Ca    8.4        11 Jul 2018 03:52            General: comfortable, NAD  Neurology: A&Ox3, nonfocal  Head:  Normocephalic, atraumatic  ENT:  Mucosa moist, no ulcerations; r wick; no tenderness  Neck:  Supple, no JVD,   Skin: no breakdowns (as per RN)  Resp: CTA B/L  CV: RRR, S1S2,   GI: Soft, NT, bowel sounds  MS: No edema, + peripheral pulses, FROM all 4 extremity      A/P:  33 y/o male with otitis externa  - R Ear wick  - floxin drops to R ear, 5 drops Q 12  _ IV abx; call ID Dr Main to advise if can change to PO  - pain control  - ENT f/u daily    DC when cleared by ENT and ID    DVT prophylaxis  Decubitus prevention- all measures as per RN protocol  Please call or text me with any questions or updates

## 2018-07-12 NOTE — PROGRESS NOTE ADULT - SUBJECTIVE AND OBJECTIVE BOX
KE GUARDADO 32y Male  MRN#: [id number]       SUBJECTIVE  Patient is a 32y old Male who presents with a chief complaint of right sided ear pain and drainage (11 Jul 2018 11:59)  Currently admitted to medicine with the primary diagnosis of mastoiditis.    Present Today:           Andrew Catheter (x)No/ ()Yes? Indication:          Central Line (x)No/ ()Yes? Indication:          IV Fluids (x)No/ ()Yes? Type:  Rate:  Indication:      OBJECTIVE  PAST MEDICAL & SURGICAL HISTORY  No pertinent past medical history  ACL (anterior cruciate ligament) tear: Left ACl torn    ALLERGIES:  morphine (Rash)    MEDICATIONS:  STANDING MEDICATIONS  cefepime   IVPB 2000 milliGRAM(s) IV Intermittent every 8 hours  naproxen 500 milliGRAM(s) Oral every 12 hours  pantoprazole    Tablet 40 milliGRAM(s) Oral every 24 hours  predniSONE   Tablet 40 milliGRAM(s) Oral every 24 hours    PRN MEDICATIONS  oxyCODONE    5 mG/acetaminophen 325 mG 1 Tablet(s) Oral every 6 hours PRN      VITAL SIGNS: Last 24 Hours  T(C): 36.4 (12 Jul 2018 12:48), Max: 36.9 (11 Jul 2018 22:39)  T(F): 97.5 (12 Jul 2018 12:48), Max: 98.4 (11 Jul 2018 22:39)  HR: 80 (12 Jul 2018 12:48) (80 - 90)  BP: 172/85 (12 Jul 2018 12:48) (133/72 - 172/85)  BP(mean): --  RR: 18 (12 Jul 2018 12:48) (18 - 20)  SpO2: 98% (11 Jul 2018 22:39) (98% - 98%)    LABS:                        15.3   11.43 )-----------( 150      ( 11 Jul 2018 03:00 )             46.2     07-12    145  |  105  |  19  ----------------------------<  100<H>  4.1   |  25  |  1.1    Ca    9.1      12 Jul 2018 08:27  Mg     2.2     07-12      RADIOLOGY:    < from: CT Orbit w/ IV Cont (07.11.18 @ 05:12) >  EXAM:  CT ORBITS IC            PROCEDURE DATE:  07/11/2018        INTERPRETATION:  Clinical History / Reason for exam: Clinical suspicion   for mastoiditis.    Technique: Multiple axial CT images of the temporal bones was performed   before andafter the administration of 100 cc Optiray 320 intravenous   contrast.    No comparisons are available.    Findings:    Right temporal bone: There is significant soft tissue thickening   involving the right external auditory canal. No fluid collection or bony   erosion is seen. Soft tissue attenuation material fills the right   external auditory canal to the tympanic membrane. No definite adjacent   bony erosion is seen.    There is partial opacification of the middle ear cavity. The ossicles are  intact. Fluid with fluid/fluid levels is seen in the right mastoid air   cells. There is no bony erosion or evidence of coalescence. The internal   auditory canal, cochlea, semicircular canals and the facial canal are   normal in appearance.    Left temporal bone: The external auditory canal is normal in appearance.   The middle ear cavity and the mastoid air cells are patent without   opacification. The ossicles are intact. The internal auditory canal,   cochlea, semicircular canal and the facial canal are normal in appearance.    The visualized dural sinuses are patent.    Impression:    Right temporal bone: Findings compatible with otitis externa and   otomastoiditis without osseous erosion/coalescence.    Left temporal bone: Unremarkable evaluation of the left temporal bone.      SHABNAM OBREGON M.D., ATTENDING RADIOLOGIST  This document has been electronically signed. Jul 11 2018  8:32AM    PHYSICAL EXAM:    GENERAL: NAD, well-developed, AAOx3  HEENT:  Atraumatic, Normocephalic. Slight swelling of right mandibular region; wick in ear noted.  PULMONARY: Clear to auscultation bilaterally; No wheeze  CARDIOVASCULAR: Regular rate and rhythm; No murmurs, rubs, or gallops  GASTROINTESTINAL: Soft, Nontender, Nondistended  MUSCULOSKELETAL:  2+ Peripheral Pulses, No clubbing, cyanosis, or edema  NEUROLOGY: non-focal  SKIN: No rashes or lesions

## 2018-07-13 ENCOUNTER — TRANSCRIPTION ENCOUNTER (OUTPATIENT)
Age: 33
End: 2018-07-13

## 2018-07-13 VITALS
SYSTOLIC BLOOD PRESSURE: 128 MMHG | TEMPERATURE: 97 F | RESPIRATION RATE: 18 BRPM | DIASTOLIC BLOOD PRESSURE: 75 MMHG | HEART RATE: 84 BPM

## 2018-07-13 LAB
ANION GAP SERPL CALC-SCNC: 17 MMOL/L — HIGH (ref 7–14)
BUN SERPL-MCNC: 18 MG/DL — SIGNIFICANT CHANGE UP (ref 10–20)
CALCIUM SERPL-MCNC: 9.1 MG/DL — SIGNIFICANT CHANGE UP (ref 8.5–10.1)
CHLORIDE SERPL-SCNC: 102 MMOL/L — SIGNIFICANT CHANGE UP (ref 98–110)
CO2 SERPL-SCNC: 21 MMOL/L — SIGNIFICANT CHANGE UP (ref 17–32)
CREAT SERPL-MCNC: 1 MG/DL — SIGNIFICANT CHANGE UP (ref 0.7–1.5)
GLUCOSE SERPL-MCNC: 185 MG/DL — HIGH (ref 70–99)
HCT VFR BLD CALC: 44.9 % — SIGNIFICANT CHANGE UP (ref 42–52)
HGB BLD-MCNC: 14.6 G/DL — SIGNIFICANT CHANGE UP (ref 14–18)
MAGNESIUM SERPL-MCNC: 2.2 MG/DL — SIGNIFICANT CHANGE UP (ref 1.8–2.4)
MCHC RBC-ENTMCNC: 26.7 PG — LOW (ref 27–31)
MCHC RBC-ENTMCNC: 32.5 G/DL — SIGNIFICANT CHANGE UP (ref 32–37)
MCV RBC AUTO: 82.2 FL — SIGNIFICANT CHANGE UP (ref 80–94)
NRBC # BLD: 0 /100 WBCS — SIGNIFICANT CHANGE UP (ref 0–0)
PLATELET # BLD AUTO: 176 K/UL — SIGNIFICANT CHANGE UP (ref 130–400)
POTASSIUM SERPL-MCNC: 4.3 MMOL/L — SIGNIFICANT CHANGE UP (ref 3.5–5)
POTASSIUM SERPL-SCNC: 4.3 MMOL/L — SIGNIFICANT CHANGE UP (ref 3.5–5)
RBC # BLD: 5.46 M/UL — SIGNIFICANT CHANGE UP (ref 4.7–6.1)
RBC # FLD: 13 % — SIGNIFICANT CHANGE UP (ref 11.5–14.5)
SODIUM SERPL-SCNC: 140 MMOL/L — SIGNIFICANT CHANGE UP (ref 135–146)
WBC # BLD: 8.15 K/UL — SIGNIFICANT CHANGE UP (ref 4.8–10.8)
WBC # FLD AUTO: 8.15 K/UL — SIGNIFICANT CHANGE UP (ref 4.8–10.8)

## 2018-07-13 RX ORDER — PANTOPRAZOLE SODIUM 20 MG/1
1 TABLET, DELAYED RELEASE ORAL
Qty: 10 | Refills: 0
Start: 2018-07-13 | End: 2018-07-22

## 2018-07-13 RX ORDER — CIPROFLOXACIN HCL 0.3 %
5 DROPS OPHTHALMIC (EYE) EVERY 12 HOURS
Qty: 0 | Refills: 0 | Status: DISCONTINUED | OUTPATIENT
Start: 2018-07-13 | End: 2018-07-13

## 2018-07-13 RX ADMIN — PANTOPRAZOLE SODIUM 40 MILLIGRAM(S): 20 TABLET, DELAYED RELEASE ORAL at 13:35

## 2018-07-13 RX ADMIN — CEFEPIME 100 MILLIGRAM(S): 1 INJECTION, POWDER, FOR SOLUTION INTRAMUSCULAR; INTRAVENOUS at 06:15

## 2018-07-13 RX ADMIN — Medication 500 MILLIGRAM(S): at 06:15

## 2018-07-13 RX ADMIN — CEFEPIME 100 MILLIGRAM(S): 1 INJECTION, POWDER, FOR SOLUTION INTRAMUSCULAR; INTRAVENOUS at 13:35

## 2018-07-13 NOTE — DISCHARGE NOTE ADULT - MEDICATION SUMMARY - MEDICATIONS TO TAKE
I will START or STAY ON the medications listed below when I get home from the hospital:    predniSONE 20 mg oral tablet  -- 2 tab(s) by mouth every 24 hours for 4 more days   -- Indication: For Otitis externa    ciprofloxacin 0.2% otic solution  -- 1 each to each affected ear every 12 hours  -- Indication: For Otitis externa    Augmentin 875 mg-125 mg oral tablet  -- 1 tab(s) by mouth every 12 hours for 13 more days   -- Indication: For Otitis externa    pantoprazole 40 mg oral delayed release tablet  -- 1 tab(s) by mouth every 24 hours  -- Indication: For for Stomach protection against steroids    Levaquin 750 mg oral tablet  -- 1 tab(s) by mouth once a day   -- Avoid prolonged or excessive exposure to direct and/or artificial sunlight while taking this medication.  Do not take dairy products, antacids, or iron preparations within one hour of this medication.  Finish all this medication unless otherwise directed by prescriber.  May cause drowsiness or dizziness.  Medication should be taken with plenty of water.    -- Indication: For Otitis externa

## 2018-07-13 NOTE — PROGRESS NOTE ADULT - ASSESSMENT
IMPRESSION:  Right acute otitis externa which is responding to current treeatment.  Mastoiditis not that impressive clinically with no evidence of bony erosion.    RECOMMENDATIONS:  Prednisone 40 mg po q24h for 4 more days.  Augmentin 875 mg q12h and Levo 750 mg q24 for 14 days.  Recall prn please.

## 2018-07-13 NOTE — DISCHARGE NOTE ADULT - CARE PLAN
Principal Discharge DX:	Otitis externa  Goal:	resolution of symptoms and prevention of recurrence of symptoms  Assessment and plan of treatment:	take your medications as indicated and follow up with ENT in 1-2 weeks.

## 2018-07-13 NOTE — DISCHARGE NOTE ADULT - CARE PROVIDER_API CALL
Christine Bravo), [facility]  [address] [place] [address]  [address] [place] [address]  Phone: [phone]  Fax: [phone]

## 2018-07-13 NOTE — PROGRESS NOTE ADULT - SUBJECTIVE AND OBJECTIVE BOX
Pt is a 32 y.o male with R OE, seen and examined at bedside - doing well. PT states hes feeling much better in regards to pain, minimal discharge from ear. PT denies any new sx's, no overnight events.    Vital Signs: T(F): 96.5 (13 Jul 2018 04:51), Max: 98.2 (12 Jul 2018 21:39), HR: 68, BP: 124/62, RR: 20  GEN: NAD, awake and alert  HEENT: NC/AT, L ear externall WNL, EAC WNL, no erythema/edema, TM intact; R ear externally WNL, no erythema/edema noted to posterior auricular/mastoid area. + mild TTP over tragus, EAC swollen closed with white pus/debris    ear was suctioned at bedside, still not able to see TM - replaced ear wick                          14.6   8.15  )-----------( 176      ( 13 Jul 2018 07:49 )             44.9

## 2018-07-13 NOTE — PROGRESS NOTE ADULT - ASSESSMENT
Pt is a 32 y.o male with R OE, ear suctioned and wick replaced    ·	restarted cipro drops (unsure of why they were not ordered - miss full day of drops yesterday)  ·	cont IV abx -> switch to PO  ·	wick will either fall out on its own, or pt can F/U in office next week for removal  ·	w/d with attng

## 2018-07-13 NOTE — PROGRESS NOTE ADULT - SUBJECTIVE AND OBJECTIVE BOX
Patient was seen and examined. Spoke with RN. Chart reviewed.  No events overnight. Much improved  Vital Signs Last 24 Hrs  T(F): 96.5 (13 Jul 2018 04:51), Max: 98.2 (12 Jul 2018 21:39)  HR: 68 (13 Jul 2018 04:51) (68 - 80)  BP: 124/62 (13 Jul 2018 04:51) (105/58 - 172/85)  SpO2: --  MEDICATIONS  (STANDING):  cefepime   IVPB 2000 milliGRAM(s) IV Intermittent every 8 hours  naproxen 500 milliGRAM(s) Oral every 12 hours  pantoprazole    Tablet 40 milliGRAM(s) Oral every 24 hours  predniSONE   Tablet 40 milliGRAM(s) Oral every 24 hours    MEDICATIONS  (PRN):  oxyCODONE    5 mG/acetaminophen 325 mG 1 Tablet(s) Oral every 6 hours PRN Severe Pain (7 - 10)    Labs:    12 Jul 2018 08:27    145    |  105    |  19     ----------------------------<  100    4.1     |  25     |  1.1      Ca    9.1        12 Jul 2018 08:27  Mg     2.2       12 Jul 2018 08:27            Culture - Blood (collected 11 Jul 2018 16:49)  Source: .Blood None  Preliminary Report (13 Jul 2018 01:01):    No growth to date.      General: comfortable, NAD  Neurology: A&Ox3, nonfocal  Head:  Normocephalic, atraumatic  ENT:  Mucosa moist, no ulcerations  Neck:  Supple, no JVD,   Skin: no breakdowns (as per RN)  Resp: CTA B/L  CV: RRR, S1S2,   GI: Soft, NT, bowel sounds  MS: No edema, + peripheral pulses, FROM all 4 extremity      A/P:  33 y/o male with otitis externa  - R Ear wick fell out  - floxin drops to R ear, 5 drops Q 12  _ IV abx; call ID Dr Main to advise if can change to PO  - pain control  - ENT f/u daily    DC when cleared by ENT and ID- possibly later today or tomorrow  DVT prophylaxis  Decubitus prevention- all measures as per RN protocol  Please call or text me with any questions or updates

## 2018-07-13 NOTE — DISCHARGE NOTE ADULT - PATIENT PORTAL LINK FT
You can access the [facility] Patient Portal, offered by [facility], by registering with the following website: [email or web address]

## 2018-07-13 NOTE — PROGRESS NOTE ADULT - SUBJECTIVE AND OBJECTIVE BOX
KE GUARDADO  32y, Male      OVERNIGHT EVENTS:    none    VITALS:  T(F): 96.5, Max: 98.2 (07-12-18 @ 21:39)  HR: 68  BP: 124/62  RR: 20Vital Signs Last 24 Hrs  T(C): 35.8 (13 Jul 2018 04:51), Max: 36.8 (12 Jul 2018 21:39)  T(F): 96.5 (13 Jul 2018 04:51), Max: 98.2 (12 Jul 2018 21:39)  HR: 68 (13 Jul 2018 04:51) (68 - 76)  BP: 124/62 (13 Jul 2018 04:51) (105/58 - 124/62)  BP(mean): --  RR: 20 (13 Jul 2018 04:51) (18 - 20)  SpO2: --    TESTS & MEASUREMENTS:                        14.6   8.15  )-----------( 176      ( 13 Jul 2018 07:49 )             44.9     07-13    140  |  102  |  18  ----------------------------<  185<H>  4.3   |  21  |  1.0    Ca    9.1      13 Jul 2018 07:49  Mg     2.2     07-13          Culture - Blood (collected 07-11-18 @ 16:49)  Source: .Blood None  Preliminary Report (07-13-18 @ 01:01):    No growth to date.            RADIOLOGY & ADDITIONAL TESTS:    ANTIBIOTICS:  cefepime   IVPB 2000 milliGRAM(s) IV Intermittent every 8 hours

## 2018-07-13 NOTE — DISCHARGE NOTE ADULT - PLAN OF CARE
resolution of symptoms and prevention of recurrence of symptoms take your medications as indicated and follow up with ENT in 1-2 weeks.

## 2018-07-17 LAB
CULTURE RESULTS: SIGNIFICANT CHANGE UP
SPECIMEN SOURCE: SIGNIFICANT CHANGE UP

## 2018-07-19 DIAGNOSIS — H60.91 UNSPECIFIED OTITIS EXTERNA, RIGHT EAR: ICD-10-CM

## 2018-07-19 DIAGNOSIS — Z88.6 ALLERGY STATUS TO ANALGESIC AGENT: ICD-10-CM

## 2018-07-19 DIAGNOSIS — H70.91 UNSPECIFIED MASTOIDITIS, RIGHT EAR: ICD-10-CM

## 2018-07-19 DIAGNOSIS — H70.90 UNSPECIFIED MASTOIDITIS, UNSPECIFIED EAR: ICD-10-CM

## 2018-07-19 PROBLEM — Z00.00 ENCOUNTER FOR PREVENTIVE HEALTH EXAMINATION: Status: ACTIVE | Noted: 2018-07-19

## 2018-07-26 ENCOUNTER — APPOINTMENT (OUTPATIENT)
Dept: OTOLARYNGOLOGY | Facility: CLINIC | Age: 33
End: 2018-07-26
Payer: COMMERCIAL

## 2018-07-26 VITALS
WEIGHT: 290 LBS | SYSTOLIC BLOOD PRESSURE: 120 MMHG | DIASTOLIC BLOOD PRESSURE: 70 MMHG | BODY MASS INDEX: 40.6 KG/M2 | HEIGHT: 71 IN

## 2018-07-26 DIAGNOSIS — H60.543 ACUTE ECZEMATOID OTITIS EXTERNA, BILATERAL: ICD-10-CM

## 2018-07-26 PROCEDURE — 99213 OFFICE O/P EST LOW 20 MIN: CPT | Mod: 25

## 2018-07-26 PROCEDURE — 69210 REMOVE IMPACTED EAR WAX UNI: CPT

## 2018-08-16 ENCOUNTER — APPOINTMENT (OUTPATIENT)
Dept: OTOLARYNGOLOGY | Facility: CLINIC | Age: 33
End: 2018-08-16

## 2019-05-07 ENCOUNTER — EMERGENCY (EMERGENCY)
Facility: HOSPITAL | Age: 34
LOS: 0 days | Discharge: HOME | End: 2019-05-07
Admitting: EMERGENCY MEDICINE
Payer: COMMERCIAL

## 2019-05-07 VITALS
SYSTOLIC BLOOD PRESSURE: 142 MMHG | HEART RATE: 92 BPM | DIASTOLIC BLOOD PRESSURE: 81 MMHG | OXYGEN SATURATION: 97 % | RESPIRATION RATE: 17 BRPM | TEMPERATURE: 99 F

## 2019-05-07 DIAGNOSIS — Z79.2 LONG TERM (CURRENT) USE OF ANTIBIOTICS: ICD-10-CM

## 2019-05-07 DIAGNOSIS — R09.81 NASAL CONGESTION: ICD-10-CM

## 2019-05-07 DIAGNOSIS — H65.01 ACUTE SEROUS OTITIS MEDIA, RIGHT EAR: ICD-10-CM

## 2019-05-07 DIAGNOSIS — H92.09 OTALGIA, UNSPECIFIED EAR: ICD-10-CM

## 2019-05-07 DIAGNOSIS — Z79.52 LONG TERM (CURRENT) USE OF SYSTEMIC STEROIDS: ICD-10-CM

## 2019-05-07 DIAGNOSIS — S83.519A SPRAIN OF ANTERIOR CRUCIATE LIGAMENT OF UNSPECIFIED KNEE, INITIAL ENCOUNTER: Chronic | ICD-10-CM

## 2019-05-07 DIAGNOSIS — Z88.5 ALLERGY STATUS TO NARCOTIC AGENT: ICD-10-CM

## 2019-05-07 DIAGNOSIS — Z79.899 OTHER LONG TERM (CURRENT) DRUG THERAPY: ICD-10-CM

## 2019-05-07 DIAGNOSIS — Z98.890 OTHER SPECIFIED POSTPROCEDURAL STATES: ICD-10-CM

## 2019-05-07 PROCEDURE — 99283 EMERGENCY DEPT VISIT LOW MDM: CPT

## 2019-05-07 NOTE — ED PROVIDER NOTE - CARE PROVIDER_API CALL
Robert Felix)  Otolaryngology  [address] [place] [address], [address] [place] [address]  Phone: [phone]  Fax: [phone]  Follow Up Time:

## 2019-05-07 NOTE — ED PROVIDER NOTE - OBJECTIVE STATEMENT
clogged ear for 1 mos since [facility]. No pain, no fever, + nasal congestion with cough. OTC not helping,

## 2019-05-07 NOTE — ED PROVIDER NOTE - NSFOLLOWUPINSTRUCTIONS_ED_ALL_ED_FT
Take augmentin 875 mg 2 times a day. Use flonase or nasal nestor nasal spray. Claritin 10 mg 1 time a day. See ENT within 2 weeks

## 2019-05-07 NOTE — ED PROVIDER NOTE - CLINICAL SUMMARY MEDICAL DECISION MAKING FREE TEXT BOX
patient with clogged ear for 1 mos. No fever, + nasal congestion, Dx Serous OM, Dc'd with Meds/ Rx see Dc instr. ENT F/u

## 2019-05-22 ENCOUNTER — APPOINTMENT (OUTPATIENT)
Dept: OTOLARYNGOLOGY | Facility: CLINIC | Age: 34
End: 2019-05-22

## 2019-06-12 ENCOUNTER — OUTPATIENT (OUTPATIENT)
Dept: OUTPATIENT SERVICES | Facility: HOSPITAL | Age: 34
LOS: 1 days | Discharge: HOME | End: 2019-06-12

## 2019-06-12 ENCOUNTER — APPOINTMENT (OUTPATIENT)
Dept: OTOLARYNGOLOGY | Facility: CLINIC | Age: 34
End: 2019-06-12
Payer: COMMERCIAL

## 2019-06-12 ENCOUNTER — LABORATORY RESULT (OUTPATIENT)
Age: 34
End: 2019-06-12

## 2019-06-12 DIAGNOSIS — H60.90 UNSPECIFIED OTITIS EXTERNA, UNSPECIFIED EAR: ICD-10-CM

## 2019-06-12 DIAGNOSIS — J01.00 ACUTE MAXILLARY SINUSITIS, UNSPECIFIED: ICD-10-CM

## 2019-06-12 DIAGNOSIS — S83.519A SPRAIN OF ANTERIOR CRUCIATE LIGAMENT OF UNSPECIFIED KNEE, INITIAL ENCOUNTER: Chronic | ICD-10-CM

## 2019-06-12 DIAGNOSIS — Z86.69 PERSONAL HISTORY OF OTHER DISEASES OF THE NERVOUS SYSTEM AND SENSE ORGANS: ICD-10-CM

## 2019-06-12 PROCEDURE — 99214 OFFICE O/P EST MOD 30 MIN: CPT | Mod: 25

## 2019-06-12 PROCEDURE — 31575 DIAGNOSTIC LARYNGOSCOPY: CPT

## 2019-06-12 NOTE — PHYSICAL EXAM
[Midline] : trachea located in midline position [Normal] : no rashes [de-identified] : right cerumen impaction

## 2019-06-13 DIAGNOSIS — G47.33 OBSTRUCTIVE SLEEP APNEA (ADULT) (PEDIATRIC): ICD-10-CM

## 2019-12-05 ENCOUNTER — EMERGENCY (EMERGENCY)
Facility: HOSPITAL | Age: 34
LOS: 0 days | Discharge: HOME | End: 2019-12-05
Admitting: EMERGENCY MEDICINE
Payer: COMMERCIAL

## 2019-12-05 VITALS
TEMPERATURE: 97 F | DIASTOLIC BLOOD PRESSURE: 85 MMHG | SYSTOLIC BLOOD PRESSURE: 168 MMHG | WEIGHT: 300.05 LBS | HEART RATE: 106 BPM | RESPIRATION RATE: 18 BRPM | OXYGEN SATURATION: 97 %

## 2019-12-05 VITALS — HEART RATE: 104 BPM | DIASTOLIC BLOOD PRESSURE: 85 MMHG | SYSTOLIC BLOOD PRESSURE: 153 MMHG

## 2019-12-05 DIAGNOSIS — Z88.5 ALLERGY STATUS TO NARCOTIC AGENT: ICD-10-CM

## 2019-12-05 DIAGNOSIS — S83.519A SPRAIN OF ANTERIOR CRUCIATE LIGAMENT OF UNSPECIFIED KNEE, INITIAL ENCOUNTER: Chronic | ICD-10-CM

## 2019-12-05 DIAGNOSIS — H66.91 OTITIS MEDIA, UNSPECIFIED, RIGHT EAR: ICD-10-CM

## 2019-12-05 DIAGNOSIS — H92.09 OTALGIA, UNSPECIFIED EAR: ICD-10-CM

## 2019-12-05 PROCEDURE — 99283 EMERGENCY DEPT VISIT LOW MDM: CPT

## 2019-12-05 RX ORDER — IBUPROFEN 200 MG
600 TABLET ORAL ONCE
Refills: 0 | Status: COMPLETED | OUTPATIENT
Start: 2019-12-05 | End: 2019-12-05

## 2019-12-05 RX ADMIN — Medication 1 TABLET(S): at 10:52

## 2019-12-05 RX ADMIN — Medication 600 MILLIGRAM(S): at 10:52

## 2019-12-05 NOTE — ED PROVIDER NOTE - PATIENT PORTAL LINK FT
You can access the FollowMyHealth Patient Portal offered by [facility] by registering at the following website: [email or web address]. By joining [employer]’s FollowMyHealth portal, you will also be able to view your health information using other applications (apps) compatible with our system.

## 2019-12-05 NOTE — ED PROVIDER NOTE - PROVIDER TOKENS
FREE:[LAST:[your PMD 2 DAYS],PHONE:[(   )    -],FAX:[(   )    -]],PROVIDER:[TOKEN:[[id number]],FOLLOWUP:[1-3 Days]]

## 2019-12-05 NOTE — ED PROVIDER NOTE - CARE PROVIDERS DIRECT ADDRESSES
,DirectAddress_Unknown,[email or web address]

## 2019-12-05 NOTE — ED PROVIDER NOTE - PHYSICAL EXAMINATION
Gen: Alert, NAD, well appearing  Head: NC, AT, PERRL, EOMI, normal lids/conjunctiva  ENT: normal hearing, patent oropharynx without erythema/exudate. Right ear: erythema to TM.  No swelling or exudate to ear canal. No tragus or mastoid tenderness. Left  ear: WNL  Neck: +supple, no tenderness/meningismus,  Pulm: Bilateral BS, normal resp effort, no wheeze/stridor/retractions  CV: RRR, no murmer  Abd: soft, NT/ND  Mskel: no edema/erythema/cyanosis  Skin: no rash, warm/dry  Neuro: AAOx3, no sensory/motor deficits

## 2019-12-05 NOTE — ED PROVIDER NOTE - OBJECTIVE STATEMENT
33 yo M c/o right ear pain since last night. Pain is constant, moderate with no modifying factors. No fevers. Patient had sinus infection 2 days ago.

## 2019-12-05 NOTE — ED PROVIDER NOTE - CARE PROVIDER_API CALL
your PMD 2 DAYS,   Phone: (   )    -  Fax: (   )    -  Follow Up Time:     Robert Felix)  Otolaryngology  [address] [place] [address], [address] [place] [address]  Phone: [phone]  Fax: [phone]  Follow Up Time: 1-3 Days

## 2019-12-05 NOTE — ED ADULT TRIAGE NOTE - CHIEF COMPLAINT QUOTE
pt had sinus infection now complaints of right ear infection and pain. denies fever. took 800mg of motrin at 6am

## 2019-12-05 NOTE — ED ADULT NURSE NOTE - OBJECTIVE STATEMENT
Pt a&ox3, pt states he began having right ear pain this morning, states he has a history of ear infections. Pt denies fever/chills, n/v/d, CP/SOB.

## 2019-12-05 NOTE — ED PROVIDER NOTE - NS ED ROS FT
Review of Systems    Constitutional: (-) fever, (-) chills  Eyes/ENT: (-) blurry vision, (-) epistaxis, (-) sore throat, (+) right ear pain  Cardiovascular: (-) chest pain, (-) syncope  Respiratory: (-) cough, (-) shortness of breath  Gastrointestinal: (-) pain, (-) nausea, (-) vomiting, (-) diarrhea  Musculoskeletal: (-) neck pain, (-) back pain, (-) joint pain  Integumentary: (-) rash, (-) edema  Neurological: (-) headache, (-) altered mental status

## 2020-01-26 ENCOUNTER — INPATIENT (INPATIENT)
Facility: HOSPITAL | Age: 35
LOS: 1 days | Discharge: LEFT BEFORE DISCHARGE | End: 2020-01-28
Attending: INTERNAL MEDICINE | Admitting: INTERNAL MEDICINE
Payer: COMMERCIAL

## 2020-01-26 VITALS
OXYGEN SATURATION: 98 % | HEIGHT: 71 IN | RESPIRATION RATE: 18 BRPM | SYSTOLIC BLOOD PRESSURE: 148 MMHG | HEART RATE: 111 BPM | WEIGHT: 279.99 LBS | TEMPERATURE: 98 F | DIASTOLIC BLOOD PRESSURE: 96 MMHG

## 2020-01-26 DIAGNOSIS — S83.519A SPRAIN OF ANTERIOR CRUCIATE LIGAMENT OF UNSPECIFIED KNEE, INITIAL ENCOUNTER: Chronic | ICD-10-CM

## 2020-01-26 LAB
ALBUMIN SERPL ELPH-MCNC: 4.8 G/DL — SIGNIFICANT CHANGE UP (ref 3.5–5.2)
ALP SERPL-CCNC: 73 U/L — SIGNIFICANT CHANGE UP (ref 30–115)
ALT FLD-CCNC: 226 U/L — HIGH (ref 0–41)
ANION GAP SERPL CALC-SCNC: 21 MMOL/L — HIGH (ref 7–14)
AST SERPL-CCNC: 121 U/L — HIGH (ref 0–41)
BASOPHILS # BLD AUTO: 0 K/UL — SIGNIFICANT CHANGE UP (ref 0–0.2)
BASOPHILS NFR BLD AUTO: 0 % — SIGNIFICANT CHANGE UP (ref 0–1)
BILIRUB SERPL-MCNC: 1.1 MG/DL — SIGNIFICANT CHANGE UP (ref 0.2–1.2)
BUN SERPL-MCNC: 15 MG/DL — SIGNIFICANT CHANGE UP (ref 10–20)
CALCIUM SERPL-MCNC: 9.9 MG/DL — SIGNIFICANT CHANGE UP (ref 8.5–10.1)
CHLORIDE SERPL-SCNC: 102 MMOL/L — SIGNIFICANT CHANGE UP (ref 98–110)
CO2 SERPL-SCNC: 17 MMOL/L — SIGNIFICANT CHANGE UP (ref 17–32)
CREAT SERPL-MCNC: 1 MG/DL — SIGNIFICANT CHANGE UP (ref 0.7–1.5)
EOSINOPHIL # BLD AUTO: 0.15 K/UL — SIGNIFICANT CHANGE UP (ref 0–0.7)
EOSINOPHIL NFR BLD AUTO: 1 % — SIGNIFICANT CHANGE UP (ref 0–8)
GLUCOSE SERPL-MCNC: 159 MG/DL — HIGH (ref 70–99)
HCT VFR BLD CALC: 53.7 % — HIGH (ref 42–52)
HGB BLD-MCNC: 17.8 G/DL — SIGNIFICANT CHANGE UP (ref 14–18)
LIDOCAIN IGE QN: 25 U/L — SIGNIFICANT CHANGE UP (ref 7–60)
LYMPHOCYTES # BLD AUTO: 1.21 K/UL — SIGNIFICANT CHANGE UP (ref 1.2–3.4)
LYMPHOCYTES # BLD AUTO: 8 % — LOW (ref 20.5–51.1)
MANUAL SMEAR VERIFICATION: SIGNIFICANT CHANGE UP
MCHC RBC-ENTMCNC: 28 PG — SIGNIFICANT CHANGE UP (ref 27–31)
MCHC RBC-ENTMCNC: 33.1 G/DL — SIGNIFICANT CHANGE UP (ref 32–37)
MCV RBC AUTO: 84.4 FL — SIGNIFICANT CHANGE UP (ref 80–94)
MONOCYTES # BLD AUTO: 1.66 K/UL — HIGH (ref 0.1–0.6)
MONOCYTES NFR BLD AUTO: 11 % — HIGH (ref 1.7–9.3)
NEUTROPHILS # BLD AUTO: 12.09 K/UL — HIGH (ref 1.4–6.5)
NEUTROPHILS NFR BLD AUTO: 80 % — HIGH (ref 42.2–75.2)
NRBC # BLD: 0 /100 — SIGNIFICANT CHANGE UP (ref 0–0)
NRBC # BLD: SIGNIFICANT CHANGE UP /100 WBCS (ref 0–0)
PLAT MORPH BLD: NORMAL — SIGNIFICANT CHANGE UP
PLATELET # BLD AUTO: 195 K/UL — SIGNIFICANT CHANGE UP (ref 130–400)
POTASSIUM SERPL-MCNC: 4.2 MMOL/L — SIGNIFICANT CHANGE UP (ref 3.5–5)
POTASSIUM SERPL-SCNC: 4.2 MMOL/L — SIGNIFICANT CHANGE UP (ref 3.5–5)
PROT SERPL-MCNC: 8.7 G/DL — HIGH (ref 6–8)
RBC # BLD: 6.36 M/UL — HIGH (ref 4.7–6.1)
RBC # FLD: 13.9 % — SIGNIFICANT CHANGE UP (ref 11.5–14.5)
RBC BLD AUTO: NORMAL — SIGNIFICANT CHANGE UP
SODIUM SERPL-SCNC: 140 MMOL/L — SIGNIFICANT CHANGE UP (ref 135–146)
WBC # BLD: 15.11 K/UL — HIGH (ref 4.8–10.8)
WBC # FLD AUTO: 15.11 K/UL — HIGH (ref 4.8–10.8)

## 2020-01-26 PROCEDURE — 99285 EMERGENCY DEPT VISIT HI MDM: CPT

## 2020-01-26 PROCEDURE — 76705 ECHO EXAM OF ABDOMEN: CPT | Mod: 26

## 2020-01-26 RX ORDER — FAMOTIDINE 10 MG/ML
20 INJECTION INTRAVENOUS ONCE
Refills: 0 | Status: COMPLETED | OUTPATIENT
Start: 2020-01-26 | End: 2020-01-26

## 2020-01-26 RX ORDER — SODIUM CHLORIDE 9 MG/ML
1000 INJECTION INTRAMUSCULAR; INTRAVENOUS; SUBCUTANEOUS ONCE
Refills: 0 | Status: COMPLETED | OUTPATIENT
Start: 2020-01-26 | End: 2020-01-26

## 2020-01-26 RX ORDER — ONDANSETRON 8 MG/1
4 TABLET, FILM COATED ORAL ONCE
Refills: 0 | Status: COMPLETED | OUTPATIENT
Start: 2020-01-26 | End: 2020-01-26

## 2020-01-26 RX ADMIN — SODIUM CHLORIDE 1000 MILLILITER(S): 9 INJECTION INTRAMUSCULAR; INTRAVENOUS; SUBCUTANEOUS at 21:34

## 2020-01-26 RX ADMIN — ONDANSETRON 4 MILLIGRAM(S): 8 TABLET, FILM COATED ORAL at 21:33

## 2020-01-26 RX ADMIN — SODIUM CHLORIDE 1000 MILLILITER(S): 9 INJECTION INTRAMUSCULAR; INTRAVENOUS; SUBCUTANEOUS at 22:28

## 2020-01-26 RX ADMIN — SODIUM CHLORIDE 2000 MILLILITER(S): 9 INJECTION INTRAMUSCULAR; INTRAVENOUS; SUBCUTANEOUS at 21:34

## 2020-01-26 RX ADMIN — ONDANSETRON 4 MILLIGRAM(S): 8 TABLET, FILM COATED ORAL at 22:45

## 2020-01-26 RX ADMIN — FAMOTIDINE 20 MILLIGRAM(S): 10 INJECTION INTRAVENOUS at 21:34

## 2020-01-26 NOTE — ED PROVIDER NOTE - OBJECTIVE STATEMENT
34 y.o M w/ pmhx sleep apnea p/w sudden onset n/v x2 episodes nbnb pta one episode in ED a/w cramping back pain after emesis and epistaxis. Pt otherwise without abd pain. 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 He woke up to this vomiting. Denies recent alcohol consumption. Otherwise, no diarrhea, no dysuria, no fevers, no hx abd surgery, no dizziness.

## 2020-01-26 NOTE — ED ADULT TRIAGE NOTE - CHIEF COMPLAINT QUOTE
Pt reports abdominal cramping associated with two episodes of bloody vomitus and nausea for 2 hours PTA. Pt reports upper back pain after vomiting.

## 2020-01-26 NOTE — ED PROVIDER NOTE - CARE PLAN
Principal Discharge DX:	Dehydration  Secondary Diagnosis:	Nausea & vomiting  Secondary Diagnosis:	Diarrhea

## 2020-01-26 NOTE — ED PROVIDER NOTE - ATTENDING CONTRIBUTION TO CARE
33 yo male PMH DAVONTE c/o acute onset of N/V/D upon waking up this evening,   As vomiting started he also develop  left sided nose bleed.  Denies HA, bloody stools or bloody emesis, no abdominal pain, fever, chills or any other acute complaints.  No known sick contacts, bad food exposure or abx use.  Well-appearing male, NAD, PERRL, mmm, dry blood at the left naris. nml work of breathing, lungs CTA b./l, speaking full sentences, RRR, abdomen obese soft, NT/ND, awake and alert.  Will  check labs, give fluids, antiemetics, reassess.

## 2020-01-26 NOTE — ED PROVIDER NOTE - CLINICAL SUMMARY MEDICAL DECISION MAKING FREE TEXT BOX
33 yo M presented to ED for nausea and vomiting. Labs were significant for elevated LFTs. Patient had US which demonstrated gallbladder sludge but otherwise unremarkable. Despite 4 liters of fluid patient remained tachycardic with HR in the 120s-130. Admitted for dehydration and further evaluation.

## 2020-01-26 NOTE — ED PROVIDER NOTE - NS ED ROS FT
Constitutional:  (-) fever, (-) chills, (-) lethargy  Eyes:  (-) eye pain (-) visual changes  ENMT: (-) nasal discharge, (-) sore throat. (-) neck pain or stiffness  Cardiac: (-) chest pain (-) palpitations  Respiratory:  (-) cough (-) respiratory distress.   GI:  (+) nausea (+) vomiting (-) diarrhea (-) abdominal pain.  :  (-) dysuria (-) frequency (-) burning.  MS:  (+) back pain (-) joint pain.  Neuro:  (-) headache (-) numbness (-) tingling (-) focal weakness  Skin:  (-) rash  Except as documented in the HPI,  all other systems are negative

## 2020-01-26 NOTE — ED PROVIDER NOTE - PROGRESS NOTE DETAILS
Patient appears well, feeling better, abdomen is soft, NT/ND.  Case endorsed to Dr Quiñones to  reassess and dispo. BI: pt feeling much better after interventions. No more vomiting in ED, however pt continues to have diarrhea. Pt persistently tachycardic. Making urine but dark yellow. Will give one more bolus and reassess. Pt tolerating po. BI: after 4th fluid bolus, pt persistently tachycardic to 127. d/w patient and family. Will admit for dehydration. Pt agreed with plan.

## 2020-01-27 LAB
CULTURE RESULTS: SIGNIFICANT CHANGE UP
FLU A RESULT: NEGATIVE — SIGNIFICANT CHANGE UP
FLU A RESULT: NEGATIVE — SIGNIFICANT CHANGE UP
FLUAV AG NPH QL: NEGATIVE — SIGNIFICANT CHANGE UP
FLUBV AG NPH QL: NEGATIVE — SIGNIFICANT CHANGE UP
RSV RESULT: NEGATIVE — SIGNIFICANT CHANGE UP
RSV RNA RESP QL NAA+PROBE: NEGATIVE — SIGNIFICANT CHANGE UP
SPECIMEN SOURCE: SIGNIFICANT CHANGE UP

## 2020-01-27 PROCEDURE — 93010 ELECTROCARDIOGRAM REPORT: CPT

## 2020-01-27 RX ORDER — ACETAMINOPHEN 500 MG
650 TABLET ORAL EVERY 6 HOURS
Refills: 0 | Status: DISCONTINUED | OUTPATIENT
Start: 2020-01-27 | End: 2020-01-28

## 2020-01-27 RX ORDER — SODIUM CHLORIDE 9 MG/ML
1000 INJECTION, SOLUTION INTRAVENOUS ONCE
Refills: 0 | Status: COMPLETED | OUTPATIENT
Start: 2020-01-27 | End: 2020-01-27

## 2020-01-27 RX ORDER — CIPROFLOXACIN 6 MG/ML
1 SUSPENSION INTRATYMPANIC
Qty: 0 | Refills: 0 | DISCHARGE

## 2020-01-27 RX ORDER — SODIUM CHLORIDE 9 MG/ML
1000 INJECTION, SOLUTION INTRAVENOUS
Refills: 0 | Status: DISCONTINUED | OUTPATIENT
Start: 2020-01-27 | End: 2020-01-27

## 2020-01-27 RX ORDER — METOCLOPRAMIDE HCL 10 MG
10 TABLET ORAL ONCE
Refills: 0 | Status: COMPLETED | OUTPATIENT
Start: 2020-01-27 | End: 2020-01-27

## 2020-01-27 RX ORDER — METOCLOPRAMIDE HCL 10 MG
10 TABLET ORAL EVERY 8 HOURS
Refills: 0 | Status: DISCONTINUED | OUTPATIENT
Start: 2020-01-27 | End: 2020-01-28

## 2020-01-27 RX ORDER — SODIUM CHLORIDE 9 MG/ML
1000 INJECTION INTRAMUSCULAR; INTRAVENOUS; SUBCUTANEOUS
Refills: 0 | Status: DISCONTINUED | OUTPATIENT
Start: 2020-01-27 | End: 2020-01-28

## 2020-01-27 RX ADMIN — SODIUM CHLORIDE 100 MILLILITER(S): 9 INJECTION INTRAMUSCULAR; INTRAVENOUS; SUBCUTANEOUS at 12:00

## 2020-01-27 RX ADMIN — SODIUM CHLORIDE 1000 MILLILITER(S): 9 INJECTION, SOLUTION INTRAVENOUS at 03:21

## 2020-01-27 RX ADMIN — SODIUM CHLORIDE 100 MILLILITER(S): 9 INJECTION INTRAMUSCULAR; INTRAVENOUS; SUBCUTANEOUS at 21:55

## 2020-01-27 RX ADMIN — SODIUM CHLORIDE 1000 MILLILITER(S): 9 INJECTION, SOLUTION INTRAVENOUS at 01:24

## 2020-01-27 RX ADMIN — Medication 650 MILLIGRAM(S): at 12:00

## 2020-01-27 RX ADMIN — SODIUM CHLORIDE 100 MILLILITER(S): 9 INJECTION INTRAMUSCULAR; INTRAVENOUS; SUBCUTANEOUS at 15:28

## 2020-01-27 RX ADMIN — Medication 10 MILLIGRAM(S): at 01:23

## 2020-01-27 RX ADMIN — Medication 10 MILLIGRAM(S): at 12:00

## 2020-01-27 RX ADMIN — SODIUM CHLORIDE 1000 MILLILITER(S): 9 INJECTION, SOLUTION INTRAVENOUS at 01:23

## 2020-01-27 RX ADMIN — Medication 650 MILLIGRAM(S): at 21:19

## 2020-01-27 RX ADMIN — Medication 650 MILLIGRAM(S): at 08:22

## 2020-01-27 NOTE — ED ADULT NURSE NOTE - OBJECTIVE STATEMENT
Pt presented to ED d/t abd pain. As per pt, pt reports abdominal cramping associated with two episodes of bloody n/v and nausea x1 day. Pt denies fevers/chills. Pt A&Ox4, ambulatory.

## 2020-01-27 NOTE — ED ADULT NURSE REASSESSMENT NOTE - NS ED NURSE REASSESS COMMENT FT1
Pt received beginning of shift, alert and oriented x 4, able to make needs known. Pt denies any complaints of pain or discomfort. No SOB or distress noted. Pt. remains tachycardic, HR ranging 110-116. Remains asymptomatic. Will continue to monitor.

## 2020-01-27 NOTE — H&P ADULT - HISTORY OF PRESENT ILLNESS
Patient is a 35 yo M with PMHx of DAVONTE on CPAP presents with complaints of nausea, vomiting, and some diarrhea. 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 Upon returning home, he reports having severe episodes of symptoms described above. Patient says that he vomited x2 and had a little bit of a nose bleed which he also thought might be blood in his vomit. So, he came in to the ED for further evaluation. In ED, the patient was given Reglan, Zofran x2, Pepcid, and a total of 4L of fluid. His symptoms improved dramatically and he was able to tolerate a light PO diet. However, his heart rate was persistently elevated to 100-130 with EKG showing sinus tachycardia. Patient was concerned, and so he is admitted for further monitoring. Patient is a 35 yo M with PMHx of DAVONTE on CPAP presents with complaints of nausea, vomiting, and some diarrhea. 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 Upon returning home, he reports having severe episodes of symptoms described above. Patient says that he vomited x2 and had a little bit of a nose bleed which he also thought might be blood in his vomit. So, he came in to the ED for further evaluation. In ED, the patient was given Reglan, Zofran x2, Pepcid, and a total of 4L of fluid. His symptoms improved dramatically and he was able to tolerate a light PO diet. However, his heart rate was persistently elevated to 100-130 with EKG showing sinus tachycardia. Patient was concerned, so he was admitted for further monitoring.

## 2020-01-27 NOTE — H&P ADULT - NSHPLABSRESULTS_GEN_ALL_CORE
17.8   15.11  )----------(  195       ( 26 Jan 2020 20:34 )               53.7    01-26    140  |  102  |  15  ----------------------------<  159<H>  4.2   |  17  |  1.0    Ca    9.9      26 Jan 2020 20:34    TPro  8.7<H>  /  Alb  4.8  /  TBili  1.1  /  DBili  x   /  AST  121<H>  /  ALT  226<H>  /  AlkPhos  73  01-26    < from: US Abdomen Limited (01.26.20 @ 23:35) >    IMPRESSION:    Gallbladder sludge, with no evidence of acute cholecystitis.    < end of copied text >

## 2020-01-27 NOTE — H&P ADULT - NSHPPHYSICALEXAM_GEN_ALL_CORE
Vital Signs Last 24 Hrs  T(C): 37.4 (27 Jan 2020 02:40), Max: 37.4 (27 Jan 2020 02:40)  T(F): 99.4 (27 Jan 2020 02:40), Max: 99.4 (27 Jan 2020 02:40)  HR: 127 (27 Jan 2020 04:57) (111 - 135)  BP: 131/65 (27 Jan 2020 02:40) (115/56 - 148/96)  RR: 18 (27 Jan 2020 02:40) (18 - 18)  SpO2: 99% (27 Jan 2020 02:40) (95% - 99%)    General: well developed, well nourished, NAD  Neuro: alert and oriented, no focal deficits, moves all extremities spontaneously  HEENT: NCAT, EOMI, anicteric, mucosa moist  Respiratory: airway patent, respirations unlabored  CVS: tachycardic rate with regular rhythm  Abdomen: soft, nontender, nondistended  Extremities: no edema, sensation and movement grossly intact  Skin: warm, dry, appropriate color

## 2020-01-27 NOTE — ED PEDIATRIC NURSE REASSESSMENT NOTE - NS ED NURSE REASSESS COMMENT FT2
recieadrianavd at 715 - a&o x3, lsc, abd soft, nt/nd, st on cm, vs and assessment as noted, # 18 l ac h/l site wnl, pt placed on droplet/contact isolation, flu/rvp sent, stool sent, ns hung, tylenol given, pt moved to room 7, will continue to monitor

## 2020-01-27 NOTE — H&P ADULT - ASSESSMENT
Patient is a 33 yo M with PMHx of DAVONTE on CPAP presents with complaints of nausea, vomiting, and some diarrhea.    #) Nausea, Vomiting, and Diarrhea likely in the setting of Viral Gastroenteritis  - Patient with symptoms post food consumption  - Symptomatically, patient improved Patient is a 33 yo M with PMHx of DAVONTE on CPAP presents with complaints of nausea, vomiting, and some diarrhea.    #) Nausea, Vomiting, and Diarrhea likely in the setting of Viral Gastroenteritis  - Patient with symptoms post food consumption  - Symptomatically, patient improved but found to be persistently tachycardic (NSR)  - Received 4L fluid in ER  - Cont to monitor, HR on ; Will cont with IV Fluid hydration  - Please note that patient dose endorse that he gets very anxious and suspects this is an underlying issue  - CLD for breakfast and can advance to solid as tolerated Patient is a 33 yo M with PMHx of DAVONTE on CPAP presents with complaints of nausea, vomiting, and some diarrhea.    #) Nausea, Vomiting, and Diarrhea likely in the setting of Viral Gastroenteritis  - Patient with symptoms post food consumption  - Symptomatically, patient improved but found to be persistently tachycardic (NSR)  - Received 4L fluid in ER  - Cont to monitor, HR on ; Will cont with IV Fluid hydration  - Please note that patient dose endorse that he gets very anxious and suspects this is an underlying issue  - CLD for breakfast and can advance to solid as tolerated     #) DAVONTE on CPAP  - patient follows with Dr. Urena  - CPAP ordered at his home setting of 14     #) Elevated Liver Enzymes  - RUQ unremarkable  - Patient reports he has a history of elevated liver enzymes on blood work done by his PMD. He reports he has had a full hepatitis panel done as outpatient which was negative  - Can follow up as outpatient, suspect underlying etiology is NAFLD     #Diet: CLD, can advance if breakfast is tolerated  #Activity: Ambulate as tolerated  #DVT PPx: Encourage ambulation   #GI PPx: Not indicated  #Dispo: Pending, anticipate D/C in 24 hours

## 2020-01-28 VITALS
TEMPERATURE: 99 F | DIASTOLIC BLOOD PRESSURE: 84 MMHG | RESPIRATION RATE: 20 BRPM | SYSTOLIC BLOOD PRESSURE: 143 MMHG | HEART RATE: 91 BPM

## 2020-01-28 LAB
ALBUMIN SERPL ELPH-MCNC: 4 G/DL — SIGNIFICANT CHANGE UP (ref 3.5–5.2)
ALP SERPL-CCNC: 51 U/L — SIGNIFICANT CHANGE UP (ref 30–115)
ALT FLD-CCNC: 139 U/L — HIGH (ref 0–41)
ANION GAP SERPL CALC-SCNC: 13 MMOL/L — SIGNIFICANT CHANGE UP (ref 7–14)
AST SERPL-CCNC: 75 U/L — HIGH (ref 0–41)
BILIRUB SERPL-MCNC: 0.9 MG/DL — SIGNIFICANT CHANGE UP (ref 0.2–1.2)
BUN SERPL-MCNC: 10 MG/DL — SIGNIFICANT CHANGE UP (ref 10–20)
CALCIUM SERPL-MCNC: 8.7 MG/DL — SIGNIFICANT CHANGE UP (ref 8.5–10.1)
CHLORIDE SERPL-SCNC: 103 MMOL/L — SIGNIFICANT CHANGE UP (ref 98–110)
CO2 SERPL-SCNC: 23 MMOL/L — SIGNIFICANT CHANGE UP (ref 17–32)
CREAT SERPL-MCNC: 1 MG/DL — SIGNIFICANT CHANGE UP (ref 0.7–1.5)
GLUCOSE SERPL-MCNC: 150 MG/DL — HIGH (ref 70–99)
HCT VFR BLD CALC: 45.4 % — SIGNIFICANT CHANGE UP (ref 42–52)
HGB BLD-MCNC: 14.5 G/DL — SIGNIFICANT CHANGE UP (ref 14–18)
MAGNESIUM SERPL-MCNC: 2 MG/DL — SIGNIFICANT CHANGE UP (ref 1.8–2.4)
MCHC RBC-ENTMCNC: 27.1 PG — SIGNIFICANT CHANGE UP (ref 27–31)
MCHC RBC-ENTMCNC: 31.9 G/DL — LOW (ref 32–37)
MCV RBC AUTO: 84.7 FL — SIGNIFICANT CHANGE UP (ref 80–94)
NRBC # BLD: 0 /100 WBCS — SIGNIFICANT CHANGE UP (ref 0–0)
PLATELET # BLD AUTO: 118 K/UL — LOW (ref 130–400)
POTASSIUM SERPL-MCNC: 3.6 MMOL/L — SIGNIFICANT CHANGE UP (ref 3.5–5)
POTASSIUM SERPL-SCNC: 3.6 MMOL/L — SIGNIFICANT CHANGE UP (ref 3.5–5)
PROT SERPL-MCNC: 7 G/DL — SIGNIFICANT CHANGE UP (ref 6–8)
RBC # BLD: 5.36 M/UL — SIGNIFICANT CHANGE UP (ref 4.7–6.1)
RBC # FLD: 13.7 % — SIGNIFICANT CHANGE UP (ref 11.5–14.5)
SODIUM SERPL-SCNC: 139 MMOL/L — SIGNIFICANT CHANGE UP (ref 135–146)
WBC # BLD: 6.13 K/UL — SIGNIFICANT CHANGE UP (ref 4.8–10.8)
WBC # FLD AUTO: 6.13 K/UL — SIGNIFICANT CHANGE UP (ref 4.8–10.8)

## 2020-01-28 RX ORDER — ENOXAPARIN SODIUM 100 MG/ML
40 INJECTION SUBCUTANEOUS AT BEDTIME
Refills: 0 | Status: DISCONTINUED | OUTPATIENT
Start: 2020-01-28 | End: 2020-01-28

## 2020-01-28 RX ADMIN — SODIUM CHLORIDE 100 MILLILITER(S): 9 INJECTION INTRAMUSCULAR; INTRAVENOUS; SUBCUTANEOUS at 11:47

## 2020-01-28 RX ADMIN — Medication 650 MILLIGRAM(S): at 05:07

## 2020-01-28 NOTE — PROGRESS NOTE ADULT - ASSESSMENT
33 yo M with PMHx of DAVONTE on CPAP presents with complaints of nausea, vomiting, and some diarrhea, found to have norovirus, diarrhea has resolved, tolerating PO intake, still with fevers and tachycardia, likely from the viral infection, hemodynamically stable.       # Norovirus, resolving  - Received 4L fluid in ER  -tolerating po intake, last diarrhea yesterday, still w some nausea, no vomiting, fever( Tmax 101.3) and tachycardia(max 123), likely transient from viral infection  -c/w tylenol PRN for fever, compazine PRN for nausesa  -cont to monitor and d/c if remains afebrile for 24 hours  - isolation precautions  -continue to advance diet as tolerated   -monitor lyes and replete as necc     #DAVONTE on CPAP, stable   - patient follows with Dr. Urena  - continue with CPAP at his home setting of 14     # Elevated Liver Enzymes, stable  - RUQ unremarkable  - Patient reports he has a history of elevated liver enzymes on blood work done by his PMD. He reports he has had a full hepatitis panel done as outpatient which was negative  - Can follow up as outpatient, suspect underlying etiology is NAFLD     #Diet: CLD, adv as tolerated   #Activity: Ambulate as tolerated  #DVT PPx: lovenox  #GI PPx: Not indicated  #Dispo: Pending, dispo to home once afebrile x24 hours 35 yo M with PMHx of DAVONTE on CPAP presents with complaints of nausea, vomiting, and some diarrhea, found to have norovirus, diarrhea has resolved, tolerating PO intake, still with fevers and tachycardia, likely from the viral infection, hemodynamically stable.       # Norovirus, resolving  - Received 4L fluid in ER  -tolerating po intake, last diarrhea yesterday, still w some nausea, no vomiting, fever( Tmax 101.3) and tachycardia(max 123), likely transient from viral infection  -c/w tylenol PRN for fever, compazine PRN for nausesa  -cont to monitor and d/c if remains afebrile for 24 hours  - isolation precautions  -continue to advance diet as tolerated   -BMP WNL     #DAVONTE on CPAP, stable   - patient follows with Dr. Urena  - continue with CPAP at his home setting of 14     # Elevated Liver Enzymes, stable  - RUQ unremarkable  - Patient reports he has a history of elevated liver enzymes on blood work done by his PMD. He reports he has had a full hepatitis panel done as outpatient which was negative  - Can follow up as outpatient, suspect underlying etiology is NAFLD     #Diet: CLD, adv as tolerated   #Activity: Ambulate as tolerated  #DVT PPx: lovenox  #GI PPx: Not indicated  #Dispo: Pending, dispo to home once afebrile x24 hours

## 2020-01-28 NOTE — DISCHARGE NOTE PROVIDER - HOSPITAL COURSE
35 yo M with PMHx of DAVONTE on CPAP presents with complaints of nausea, vomiting, and some diarrhea. Per patient, he worked a night shift on Saturday night and ate breakfast at 4:30AM (endorses eating a breakfast sandwich). Upon returning home, he reports having severe episodes of symptoms described above. Patient says that he vomited x2 and had a little bit of a nose bleed which he also thought might be blood in his vomit. So, he came in to the ED for further evaluation. In ED, the patient was given Reglan, Zofran x2, Pepcid, and a total of 4L of fluid. His symptoms improved dramatically and he was able to tolerate a light PO diet. However, his heart rate was persistently elevated to 100-130 with EKG showing sinus tachycardia. Patient was concerned, so he was admitted for further monitoring. GI PCR revealed norovirus, pt improved s/p IVF, toerlated PO intake and diarrhea resolved, still w fevers.....  ___________________________________

## 2020-01-28 NOTE — CHART NOTE - NSCHARTNOTEFT_GEN_A_CORE
pt signed AMA, needs to catch 5pm flight to [place]. informed norovirus is contagious and to practice good hand hygiene

## 2020-01-28 NOTE — PROGRESS NOTE ADULT - SUBJECTIVE AND OBJECTIVE BOX
SUBJECTIVE:    Patient is a 34y old Male who presents with a chief complaint of   Currently admitted to medicine with the primary diagnosis of Dehydration     Today is hospital day 1d. Denies abd pain, vomiting, still with mild nausea, tolerating PO intake. last diarrhea yesterday, none today. pt reports that he has a trip to [place] this evening at 5pm, wondering if he can go.     INTERVAL EVENTS: still with fevers and tachycardia     PAST MEDICAL & SURGICAL HISTORY  Obstructive sleep apnea  ACL (anterior cruciate ligament) tear: Left ACl torn      ALLERGIES:  morphine (Rash)    MEDICATIONS:  STANDING MEDICATIONS  sodium chloride 0.9%. 1000 milliLiter(s) IV Continuous <Continuous>    PRN MEDICATIONS  acetaminophen   Tablet .. 650 milliGRAM(s) Oral every 6 hours PRN  metoclopramide Injectable 10 milliGRAM(s) IV Push every 8 hours PRN    VITALS:   T(F): 98.8  HR: 107  BP: 151/71  RR: 20  SpO2: 98%    LABS:                        17.8   15.11 )-----------( 195      ( 26 Jan 2020 20:34 )             53.7     01-26    140  |  102  |  15  ----------------------------<  159<H>  4.2   |  17  |  1.0    Ca    9.9      26 Jan 2020 20:34    TPro  8.7<H>  /  Alb  4.8  /  TBili  1.1  /  DBili  x   /  AST  121<H>  /  ALT  226<H>  /  AlkPhos  73  01-26              GI PCR Panel, Stool (collected 27 Jan 2020 09:00)  Source: .Stool Feces  Final Report (27 Jan 2020 21:08):    Norovirus GI/GII    DETECTED by PCR    *******Please Note:*******    GI panel PCR evaluates for:    Campylobacter, Plesiomonas shigelloides, Salmonella,    Vibrio, Yersinia enterocolitica, Enteroaggregative    Escherichia coli (EAEC), Enteropathogenic E.coli (EPEC),    Enterotoxigenic E. coli (ETEC) lt/st, Shiga-like    toxin-producing E. coli (STEC) stx1/stx2,    Shigella/ Enteroinvasive E. coli (EIEC), Cryptosporidium,    Cyclospora cayetanensis, Entamoeba histolytica,    Giardia lamblia, Adenovirus F 40/41, Astrovirus,    Norovirus GI/GII, Rotavirus A, Sapovirus      PHYSICAL EXAM:  GEN: No acute distress, obese male   PULM/CHEST: Clear to auscultation bilaterally, no rales, rhonchi or wheezes   CVS: normal rate, regular rhythm, S1-S2, no murmurs  ABD: obese, soft, non-tender, non-distended, +BS  EXT: No edema  NEURO: AAOx3

## 2020-01-29 LAB — RAPID RVP RESULT: SIGNIFICANT CHANGE UP

## 2020-02-03 DIAGNOSIS — A08.11 ACUTE GASTROENTEROPATHY DUE TO NORWALK AGENT: ICD-10-CM

## 2020-02-03 DIAGNOSIS — R11.2 NAUSEA WITH VOMITING, UNSPECIFIED: ICD-10-CM

## 2020-02-03 DIAGNOSIS — R00.0 TACHYCARDIA, UNSPECIFIED: ICD-10-CM

## 2020-02-03 DIAGNOSIS — Z99.89 DEPENDENCE ON OTHER ENABLING MACHINES AND DEVICES: ICD-10-CM

## 2020-02-03 DIAGNOSIS — G47.33 OBSTRUCTIVE SLEEP APNEA (ADULT) (PEDIATRIC): ICD-10-CM

## 2020-02-03 DIAGNOSIS — E66.01 MORBID (SEVERE) OBESITY DUE TO EXCESS CALORIES: ICD-10-CM

## 2020-04-01 PROBLEM — G47.33 OBSTRUCTIVE SLEEP APNEA (ADULT) (PEDIATRIC): Chronic | Status: ACTIVE | Noted: 2020-01-27

## 2020-04-15 ENCOUNTER — APPOINTMENT (OUTPATIENT)
Dept: CARDIOLOGY | Facility: CLINIC | Age: 35
End: 2020-04-15
Payer: COMMERCIAL

## 2020-04-15 VITALS — HEIGHT: 71 IN | HEART RATE: 86 BPM | BODY MASS INDEX: 41.72 KG/M2 | WEIGHT: 298 LBS

## 2020-04-15 VITALS — RESPIRATION RATE: 18 BRPM | DIASTOLIC BLOOD PRESSURE: 80 MMHG | HEART RATE: 86 BPM | SYSTOLIC BLOOD PRESSURE: 130 MMHG

## 2020-04-15 DIAGNOSIS — R07.9 CHEST PAIN, UNSPECIFIED: ICD-10-CM

## 2020-04-15 DIAGNOSIS — Z78.9 OTHER SPECIFIED HEALTH STATUS: ICD-10-CM

## 2020-04-15 DIAGNOSIS — G47.33 OBSTRUCTIVE SLEEP APNEA (ADULT) (PEDIATRIC): ICD-10-CM

## 2020-04-15 DIAGNOSIS — Z87.891 PERSONAL HISTORY OF NICOTINE DEPENDENCE: ICD-10-CM

## 2020-04-15 DIAGNOSIS — K83.8 OTHER SPECIFIED DISEASES OF BILIARY TRACT: ICD-10-CM

## 2020-04-15 DIAGNOSIS — Z83.3 FAMILY HISTORY OF DIABETES MELLITUS: ICD-10-CM

## 2020-04-15 DIAGNOSIS — Z99.89 OBSTRUCTIVE SLEEP APNEA (ADULT) (PEDIATRIC): ICD-10-CM

## 2020-04-15 DIAGNOSIS — Z82.49 FAMILY HISTORY OF ISCHEMIC HEART DISEASE AND OTHER DISEASES OF THE CIRCULATORY SYSTEM: ICD-10-CM

## 2020-04-15 PROCEDURE — 93000 ELECTROCARDIOGRAM COMPLETE: CPT

## 2020-04-15 PROCEDURE — 99204 OFFICE O/P NEW MOD 45 MIN: CPT

## 2020-04-15 NOTE — PHYSICAL EXAM
[General Appearance - Well Developed] : well developed [Normal Appearance] : normal appearance [Well Groomed] : well groomed [General Appearance - Well Nourished] : well nourished [No Deformities] : no deformities [General Appearance - In No Acute Distress] : no acute distress [Normal Conjunctiva] : the conjunctiva exhibited no abnormalities [Eyelids - No Xanthelasma] : the eyelids demonstrated no xanthelasmas [Normal Oral Mucosa] : normal oral mucosa [No Oral Pallor] : no oral pallor [No Oral Cyanosis] : no oral cyanosis [Normal Jugular Venous A Waves Present] : normal jugular venous A waves present [Normal Jugular Venous V Waves Present] : normal jugular venous V waves present [No Jugular Venous Jeffrey A Waves] : no jugular venous jeffrey A waves [Heart Rate And Rhythm] : heart rate and rhythm were normal [Heart Sounds] : normal S1 and S2 [Murmurs] : no murmurs present [Respiration, Rhythm And Depth] : normal respiratory rhythm and effort [Exaggerated Use Of Accessory Muscles For Inspiration] : no accessory muscle use [Auscultation Breath Sounds / Voice Sounds] : lungs were clear to auscultation bilaterally [Bowel Sounds] : normal bowel sounds [Abdomen Soft] : soft [Abdomen Tenderness] : non-tender [Abdomen Mass (___ Cm)] : no abdominal mass palpated [Abnormal Walk] : normal gait [Gait - Sufficient For Exercise Testing] : the gait was sufficient for exercise testing [Nail Clubbing] : no clubbing of the fingernails [Cyanosis, Localized] : no localized cyanosis [Petechial Hemorrhages (___cm)] : no petechial hemorrhages [Skin Color & Pigmentation] : normal skin color and pigmentation [] : no rash [No Venous Stasis] : no venous stasis [Skin Lesions] : no skin lesions [No Skin Ulcers] : no skin ulcer [No Xanthoma] : no  xanthoma was observed [Oriented To Time, Place, And Person] : oriented to person, place, and time

## 2020-04-21 ENCOUNTER — APPOINTMENT (OUTPATIENT)
Dept: CARDIOLOGY | Facility: CLINIC | Age: 35
End: 2020-04-21

## 2020-04-24 ENCOUNTER — TRANSCRIPTION ENCOUNTER (OUTPATIENT)
Age: 35
End: 2020-04-24

## 2020-07-23 ENCOUNTER — APPOINTMENT (OUTPATIENT)
Dept: CARDIOLOGY | Facility: CLINIC | Age: 35
End: 2020-07-23

## 2020-08-11 ENCOUNTER — APPOINTMENT (OUTPATIENT)
Dept: CARDIOLOGY | Facility: CLINIC | Age: 35
End: 2020-08-11

## 2021-01-24 ENCOUNTER — OUTPATIENT (OUTPATIENT)
Dept: OUTPATIENT SERVICES | Facility: HOSPITAL | Age: 36
LOS: 1 days | Discharge: HOME | End: 2021-01-24

## 2021-01-24 DIAGNOSIS — S83.519A SPRAIN OF ANTERIOR CRUCIATE LIGAMENT OF UNSPECIFIED KNEE, INITIAL ENCOUNTER: Chronic | ICD-10-CM

## 2021-01-24 DIAGNOSIS — Z20.828 CONTACT WITH AND (SUSPECTED) EXPOSURE TO OTHER VIRAL COMMUNICABLE DISEASES: ICD-10-CM

## 2021-01-24 LAB — SARS-COV-2 RNA SPEC QL NAA+PROBE: SIGNIFICANT CHANGE UP

## 2021-07-26 ENCOUNTER — APPOINTMENT (OUTPATIENT)
Dept: OTOLARYNGOLOGY | Facility: CLINIC | Age: 36
End: 2021-07-26

## 2021-11-21 ENCOUNTER — EMERGENCY (EMERGENCY)
Facility: HOSPITAL | Age: 36
LOS: 0 days | Discharge: HOME | End: 2021-11-21
Attending: EMERGENCY MEDICINE | Admitting: EMERGENCY MEDICINE
Payer: COMMERCIAL

## 2021-11-21 VITALS
HEART RATE: 100 BPM | RESPIRATION RATE: 18 BRPM | DIASTOLIC BLOOD PRESSURE: 82 MMHG | SYSTOLIC BLOOD PRESSURE: 130 MMHG | TEMPERATURE: 98 F | OXYGEN SATURATION: 98 %

## 2021-11-21 VITALS
TEMPERATURE: 101 F | OXYGEN SATURATION: 99 % | RESPIRATION RATE: 18 BRPM | HEIGHT: 71 IN | DIASTOLIC BLOOD PRESSURE: 75 MMHG | WEIGHT: 294.98 LBS | HEART RATE: 105 BPM | SYSTOLIC BLOOD PRESSURE: 130 MMHG

## 2021-11-21 DIAGNOSIS — M79.10 MYALGIA, UNSPECIFIED SITE: ICD-10-CM

## 2021-11-21 DIAGNOSIS — R09.81 NASAL CONGESTION: ICD-10-CM

## 2021-11-21 DIAGNOSIS — Z20.822 CONTACT WITH AND (SUSPECTED) EXPOSURE TO COVID-19: ICD-10-CM

## 2021-11-21 DIAGNOSIS — R05.1 ACUTE COUGH: ICD-10-CM

## 2021-11-21 DIAGNOSIS — R50.9 FEVER, UNSPECIFIED: ICD-10-CM

## 2021-11-21 DIAGNOSIS — S83.519A SPRAIN OF ANTERIOR CRUCIATE LIGAMENT OF UNSPECIFIED KNEE, INITIAL ENCOUNTER: Chronic | ICD-10-CM

## 2021-11-21 DIAGNOSIS — Z88.5 ALLERGY STATUS TO NARCOTIC AGENT: ICD-10-CM

## 2021-11-21 LAB
RAPID RVP RESULT: SIGNIFICANT CHANGE UP
SARS-COV-2 RNA SPEC QL NAA+PROBE: SIGNIFICANT CHANGE UP

## 2021-11-21 PROCEDURE — 99284 EMERGENCY DEPT VISIT MOD MDM: CPT

## 2021-11-21 RX ORDER — ACETAMINOPHEN 500 MG
975 TABLET ORAL ONCE
Refills: 0 | Status: COMPLETED | OUTPATIENT
Start: 2021-11-21 | End: 2021-11-21

## 2021-11-21 RX ADMIN — Medication 975 MILLIGRAM(S): at 01:01

## 2021-11-21 NOTE — ED PROVIDER NOTE - OBJECTIVE STATEMENT
Pt is a 36 year old male with no PMH presents to ED with complaints of acute onset of fever. Pt states out with friends developed chills and bodyaches. Noted to be febrile at 101, took Motrin 30 min prior to ED arrival. Pt is vaccinated against COVID however denies flu vaccine. Pt denies any cough, congestion, chest pain, sob, abdominal pain, NVCD. Denies ear pain or sinu pain or throat pain

## 2021-11-21 NOTE — ED PROVIDER NOTE - CLINICAL SUMMARY MEDICAL DECISION MAKING FREE TEXT BOX
Given Tylenol.  Routine RVP swab sent.  Will D/C home to follow up with PCP.  Instructed to rest and take Tylenol for fever.

## 2021-11-21 NOTE — ED PROVIDER NOTE - NSFOLLOWUPINSTRUCTIONS_ED_ALL_ED_FT
Follow up with your primary medical doctor in 1-2 days     Fever in Adults    WHAT YOU NEED TO KNOW:    A fever is an increase in your body temperature. Normal body temperature is 98.6°F (37°C). Fever is generally defined as greater than 100.4°F (38°C). Common causes include an infection, injury, or disease such as arthritis.    DISCHARGE INSTRUCTIONS:    Return to the emergency department if:     Your fever does not go away or gets worse even after treatment.      You have a stiff neck and a bad headache.       You are confused. You may not be able to think clearly or remember things like you normally do.       Your heart beats faster than usual even after treatment.      You have shortness of breath or chest pain when you breathe.      You urinate small amounts or not at all.       Your skin, lips, or nails turn blue.     Contact your healthcare provider if:     You have abdominal pain or you feel bloated.      You have nausea or are vomiting.      You have pain or burning when you urinate, or you have pain in your back.      You have questions or concerns about your condition or care.     Medicines: You may need any of the following:     NSAIDs, such as ibuprofen, help decrease swelling, pain, and fever. This medicine is available with or without a doctor's order. NSAIDs can cause stomach bleeding or kidney problems in certain people. If you take blood thinner medicine, always ask if NSAIDs are safe for you. Always read the medicine label and follow directions. Do not give these medicines to children under 6 months of age without direction from your child's healthcare provider.      Acetaminophen decreases pain and fever. It is available without a doctor's order. Ask how much to take and how often to take it. Follow directions. Read the labels of all other medicines you are using to see if they also contain acetaminophen, or ask your doctor or pharmacist. Acetaminophen can cause liver damage if not taken correctly. Do not use more than 4 grams (4,000 milligrams) total of acetaminophen in one day.       Antibiotics may be given if you have an infection caused by bacteria.      Take your medicine as directed. Contact your healthcare provider if you think your medicine is not helping or if you have side effects. Tell him of her if you are allergic to any medicine. Keep a list of the medicines, vitamins, and herbs you take. Include the amounts, and when and why you take them. Bring the list or the pill bottles to follow-up visits. Carry your medicine list with you in case of an emergency.    Follow up with your healthcare provider as directed: Write down your questions so you remember to ask them during your visits.    Self-care:     Drink more liquids as directed. A fever makes you sweat. This can increase your risk for dehydration. Liquids can help prevent dehydration.   Drink at least 6 to 8 eight-ounce cups of clear liquids each day. Drink water, juice, or broth. Do not drink sports drinks. They may contain caffeine.      Ask your healthcare provider if you should drink an oral rehydration solution (ORS). An ORS has the right amounts of water, salts, and sugar you need to replace body fluids.      Dress in lightweight clothes. Shivers may be a sign that your fever is rising. Do not put extra blankets or clothes on. This may cause your fever to rise even higher. Dress in light, comfortable clothing. Use a lightweight blanket or sheet when you sleep. Change your clothes, blanket, or sheets if they get wet.      Cool yourself safely. Take a bath in cool or lukewarm water. Use an ice pack wrapped in a small towel or wet a washcloth with cool water. Place the ice pack or wet washcloth on your forehead or the back of your neck.          © Copyright [employer] 2019 All illustrations and images included in CareNotes are the copyrighted property of [employer].A[employer]M., Inc. or [employer].

## 2021-11-21 NOTE — ED PROVIDER NOTE - PRINCIPAL DIAGNOSIS
Over the last 2 weeks, how often have you been bothered by the following problems?         PHQ2 Score:  0  1. Little interest or pleasure in doing things?:  Not at all  2. Feeling down, depressed, or hopeless?:  Not at all        Fever

## 2021-11-21 NOTE — ED PROVIDER NOTE - ATTENDING CONTRIBUTION TO CARE
I personally evaluated the patient. I reviewed the Resident’s or Physician Assistant’s note (as assigned above), and agree with the findings and plan except as documented in my note.  Chart reviewed.  H/O sleep apnea, presents with fever, cough, congestion and body aches for few hours. Exam shows alert patient in no distress, HEENT NCAT throat clear, lungs clear, RR S1S2, abdomen soft Nt +BS, no rash.

## 2021-11-21 NOTE — ED PROVIDER NOTE - PATIENT PORTAL LINK FT
You can access the FollowMyHealth Patient Portal offered by [facility] by registering at the following website: [email or web address]. By joining [facility]’s FollowMyHealth portal, you will also be able to view your health information using other applications (apps) compatible with our system.

## 2021-11-21 NOTE — ED ADULT NURSE NOTE - NSIMPLEMENTINTERV_GEN_ALL_ED
Implemented All Universal Safety Interventions:  [place] to call system. Call bell, personal items and telephone within reach. Instruct patient to call for assistance. Room bathroom lighting operational. Non-slip footwear when patient is off stretcher. Physically safe environment: no spills, clutter or unnecessary equipment. Stretcher in lowest position, wheels locked, appropriate side rails in place.

## 2021-11-21 NOTE — ED PROVIDER NOTE - NS ED ROS FT
Constitutional: (+) fever (+) bodyaches  Eyes/ENT: (-) blurry vision, (-) epistaxis  Cardiovascular: (-) chest pain, (-) syncope  Respiratory: (-) cough, (-) shortness of breath  Gastrointestinal: (-) vomiting, (-) diarrhea  Musculoskeletal: (-) neck pain, (-) back pain, (-) joint pain  Integumentary: (-) rash, (-) edema  Neurological: (-) headache, (-) altered mental status  Psychiatric: (-) hallucinations  Allergic/Immunologic: (-) pruritus

## 2021-11-21 NOTE — ED PROVIDER NOTE - PHYSICAL EXAMINATION
Physical Exam    Vital Signs: I have reviewed the initial vital signs.  Constitutional: well-nourished, appears stated age, no acute distress  Head: NC/ AT  Eyes: Conjunctiva pink, Sclera clear, PERRLA, EOMI.  Ears: TM visualized non bulging and no canal erythema  throat: uvula midline, no tonsillar erythema or swelling.   Cardiovascular: S1 and S2, regular rate, regular rhythm, well-perfused extremities, radial pulses equal and 2+  Respiratory: unlabored respiratory effort, clear to auscultation bilaterally no wheezing, rales and rhonchi  Gastrointestinal: soft, non-tender abdomen, no pulsatile mass, normal bowl sounds  Musculoskeletal: supple neck, no lower extremity edema, no midline tenderness  Integumentary: warm, dry, no rash  Neurologic: awake, alert, cranial nerves II-XII grossly intact, extremities’ motor and sensory functions grossly intact  Psychiatric: appropriate mood, appropriate affect

## 2022-06-27 ENCOUNTER — EMERGENCY (EMERGENCY)
Facility: HOSPITAL | Age: 37
LOS: 0 days | Discharge: HOME | End: 2022-06-27
Attending: EMERGENCY MEDICINE | Admitting: EMERGENCY MEDICINE

## 2022-06-27 VITALS
HEART RATE: 98 BPM | HEIGHT: 71 IN | TEMPERATURE: 97 F | WEIGHT: 289.91 LBS | DIASTOLIC BLOOD PRESSURE: 74 MMHG | OXYGEN SATURATION: 99 % | RESPIRATION RATE: 19 BRPM | SYSTOLIC BLOOD PRESSURE: 133 MMHG

## 2022-06-27 DIAGNOSIS — G47.33 OBSTRUCTIVE SLEEP APNEA (ADULT) (PEDIATRIC): ICD-10-CM

## 2022-06-27 DIAGNOSIS — Z86.69 PERSONAL HISTORY OF OTHER DISEASES OF THE NERVOUS SYSTEM AND SENSE ORGANS: ICD-10-CM

## 2022-06-27 DIAGNOSIS — H92.01 OTALGIA, RIGHT EAR: ICD-10-CM

## 2022-06-27 DIAGNOSIS — S83.519A SPRAIN OF ANTERIOR CRUCIATE LIGAMENT OF UNSPECIFIED KNEE, INITIAL ENCOUNTER: Chronic | ICD-10-CM

## 2022-06-27 DIAGNOSIS — H66.91 OTITIS MEDIA, UNSPECIFIED, RIGHT EAR: ICD-10-CM

## 2022-06-27 DIAGNOSIS — Z88.6 ALLERGY STATUS TO ANALGESIC AGENT: ICD-10-CM

## 2022-06-27 PROCEDURE — 99284 EMERGENCY DEPT VISIT MOD MDM: CPT

## 2022-06-27 RX ORDER — KETOROLAC TROMETHAMINE 30 MG/ML
60 SYRINGE (ML) INJECTION ONCE
Refills: 0 | Status: DISCONTINUED | OUTPATIENT
Start: 2022-06-27 | End: 2022-06-27

## 2022-06-27 RX ORDER — IBUPROFEN 200 MG
1 TABLET ORAL
Qty: 15 | Refills: 0
Start: 2022-06-27 | End: 2022-07-01

## 2022-06-27 RX ORDER — CIPROFLOXACIN AND DEXAMETHASONE 3; 1 MG/ML; MG/ML
4 SUSPENSION/ DROPS AURICULAR (OTIC)
Qty: 1 | Refills: 0
Start: 2022-06-27 | End: 2022-07-03

## 2022-06-27 RX ADMIN — Medication 1 TABLET(S): at 19:55

## 2022-06-27 RX ADMIN — Medication 60 MILLIGRAM(S): at 19:55

## 2022-06-27 NOTE — ED PROVIDER NOTE - PATIENT PORTAL LINK FT
You can access the FollowMyHealth Patient Portal offered by [facility] by registering at the following website: [email or web address]. By joining [facility]’s FollowMyHealth portal, you will also be able to view your health information using other applications (apps) compatible with our system.

## 2022-06-27 NOTE — ED PROVIDER NOTE - NS ED ROS FT
Physical Exam    Vital Signs: I have reviewed the initial vital signs.  Constitutional: well-nourished, appears stated age, no acute distress  Eyes: Conjunctiva pink, Sclera clear, PERRLA, EOMI.  Ear: Right canal mild erythema noted no TM bulding or redness  Cardiovascular: S1 and S2, regular rate, regular rhythm, well-perfused extremities, radial pulses equal and 2+  Respiratory: unlabored respiratory effort, clear to auscultation bilaterally no wheezing, rales and rhonchi  Gastrointestinal: soft, non-tender abdomen, no pulsatile mass, normal bowl sounds  Musculoskeletal: supple neck, no lower extremity edema, no midline tenderness  Integumentary: warm, dry, no rash  Neurologic: awake, alert, cranial nerves II-XII grossly intact, extremities’ motor and sensory functions grossly intact  Psychiatric: appropriate mood, appropriate affect

## 2022-06-27 NOTE — ED PROVIDER NOTE - CARE PROVIDER_API CALL
Robert Felix)  Otolaryngology  [address] [place] [address], [address] [place] [address]  Phone: [phone]  Fax: [phone]  Follow Up Time:

## 2022-06-27 NOTE — ED ADULT NURSE NOTE - NSIMPLEMENTINTERV_GEN_ALL_ED
Implemented All Universal Safety Interventions:  [place] to call system. Call bell, personal items and telephone within reach. Instruct patient to call for assistance. Room bathroom lighting operational. Non-slip footwear when patient is off stretcher. Physically safe environment: no spills, clutter or unnecessary equipment. Stretcher in lowest position, wheels locked, appropriate side rails in place.

## 2022-06-27 NOTE — ED PROVIDER NOTE - NSFOLLOWUPINSTRUCTIONS_ED_ALL_ED_FT
Follow up with [facility] primary care doctor and your ENT doctor in 1-2 days     Ear Infection in Children    WHAT YOU NEED TO KNOW:    An ear infection is also called otitis media. Your child may have an ear infection in one or both ears. Your child may get an ear infection when his or her eustachian tubes become swollen or blocked. Eustachian tubes drain fluid away from the middle ear. Your child may have a buildup of fluid and pressure in his or her ear when he or she has an ear infection. The ear may become infected by germs. The germs grow easily in fluid trapped behind the eardrum.Ear Anatomy         DISCHARGE INSTRUCTIONS:    Seek care immediately if:     You see blood or pus draining from your child's ear.      Your child seems confused or cannot stay awake.      Your child has a stiff neck, headache, and a fever.    Contact your child's healthcare provider if:     Your child has a fever.      Your child is still not eating or drinking 24 hours after he or she takes medicine.      Your child has pain behind his or her ear or when you move the earlobe.      Your child's ear is sticking out from his or her head.      Your child still has signs and symptoms of an ear infection 48 hours after he or she takes medicine.      You have questions or concerns about your child's condition or care.    Medicines:     Medicines may be given to decrease your child's pain or fever, or to treat an infection caused by bacteria.       Do not give aspirin to children under 18 years of age. Your child could develop Reye syndrome if he takes aspirin. Reye syndrome can cause life-threatening brain and liver damage. Check your child's medicine labels for aspirin, salicylates, or oil of wintergreen.       Give your child's medicine as directed. Contact your child's healthcare provider if you think the medicine is not working as expected. Tell him or her if your child is allergic to any medicine. Keep a current list of the medicines, vitamins, and herbs your child takes. Include the amounts, and when, how, and why they are taken. Bring the list or the medicines in their containers to follow-up visits. Carry your child's medicine list with you in case of an emergency.    Care for your child at home:     Prop your older child's head and chest up while he or she sleeps. This may decrease ear pressure and pain. Ask your child's healthcare provider how to safely prop your child's head and chest up.      Have your child lie with his or her infected ear facing down to allow fluid to drain from the ear.       Use ice or heat to help decrease your child's ear pain. Ask which of these is best for your child, and use as directed.      Ask about ways to keep water out of your child's ears when he or she bathes or swims.     Prevent an ear infection:     Wash your and your child's hands often to help prevent the spread of germs. Ask everyone in your house to wash their hands with soap and water. Ask them to wash after they use the bathroom or change a diaper. Remind them to wash before they prepare or eat food.Handwashing           Keep your child away from people who are ill, such as sick playmates. Germs spread easily and quickly in  centers.       If possible, breastfeed your baby. Your baby may be less likely to get an ear infection if he or she is .      Do not give your child a bottle while he or she is lying down. This may cause liquid from the sinuses to leak into his or her eustachian tube.      Keep your child away from people who smoke.       Vaccinate your child. Ask your child's healthcare provider about the shots your child needs.    Follow up with your child's healthcare provider as directed: Write down your questions so you remember to ask them during your child's visits.

## 2022-06-27 NOTE — ED PROVIDER NOTE - NS ED ATTENDING STATEMENT MOD
This was a shared visit with the SOFI. I reviewed and verified the documentation and independently performed the documented:

## 2022-06-27 NOTE — ED PROVIDER NOTE - CLINICAL SUMMARY MEDICAL DECISION MAKING FREE TEXT BOX
36-year-old male history of mastoiditis presents with right ear pain started tonight.  No fever no headache no discharge.  No history of diabetes.  Exam well-appearing neurologically intact no posterior auricular erythema no ear proptosis.  EAC no erythema swelling TM normal appearance.  Antibiotics given and outpatient ENT follow-up  Patient to be discharged from ED in well appearing condition. Any available test results were discussed with and printed  for patient.  Verbal instructions given, including instructions to return to ED immediately for any new, worsening, or concerning symptoms. Limitations of ED work up discussed.  Patient reports understanding of above with capacity and insight. Written discharge instructions additionally given, including follow-up plan.

## 2022-06-27 NOTE — ED PROVIDER NOTE - OBJECTIVE STATEMENT
36 year old male comes to emergency room for rigth ear pain for the last day. patient states he has history of mastoiditis in the past and wanted to catch it early. denies fever/chills.

## 2022-07-01 ENCOUNTER — APPOINTMENT (OUTPATIENT)
Dept: OTOLARYNGOLOGY | Facility: CLINIC | Age: 37
End: 2022-07-01

## 2022-10-05 NOTE — H&P ADULT - ATTENDING COMMENTS
Patient seen and evaluated in the ED. Agree with the resident as above, except as noted in this section.    Nausea, Vomiting, and Diarrhea likely in the setting of Viral Gastroenteritis  tachycardia   DAVONTE on CPAP  Elevated Liver Enzymes  morbid obesity     still nauseous   repeat labs today   CPAP prn   advanced diet as tolerated   monitor LFT  tele  GI/DVT ppx   OOB to chair Patient seen and evaluated in the ED. Agree with the resident as above, except as noted in this section.    Nausea, Vomiting, and Diarrhea likely in the setting of Viral Gastroenteritis  tachycardia   DAVONTE on CPAP  Elevated Liver Enzymes  morbid obesity     still nauseous   antiemetics prn   repeat labs today   CPAP prn   advanced diet as tolerated   monitor LFT  GI eval   tele  GI/DVT ppx   OOB to chair Bcc Infiltrative Histology Text: There were numerous aggregates of basaloid cells demonstrating an infiltrative pattern.

## 2024-08-11 NOTE — ED ADULT NURSE NOTE - EXTENSIONS OF SELF_ADULT
Emergency Medicine Transition of Care Note.    I received Maksim Palmer in signout from Dr. LeJeune.  Please see the previous ED provider note for all HPI, PE and MDM up to the time of signout. This is in addition to the primary record.    In brief Maksim Palmer is an 87 y.o. male presenting for   Chief Complaint   Patient presents with    Rapid Heart Rate     Pt had a procedure done yesterday, and today he forgot to take am metoprolol later felt like heart rate was racing rate 109 and then took metoprolol and rate came down. Denies pain now           ED Course as of 08/11/24 0100   Sat Aug 10, 2024   2239 Atrial paced rhythm 72 beats minute.  Normal intervals.  No ST elevations or depressions [ML]   2336 Work obtained to the patient tachycardia with no he has normal tensive and normal cardiac at this time.  Troponin is 40.  Labs otherwise unremarkable.  Magnesium 1.7 will replace.  Patient exhibits no chest pain or shortness of breath.  Repeat troponin to decide final disposition [ML]      ED Course User Index  [ML] Marty R Lejeune, DO         Diagnoses as of 08/11/24 0100   Palpitations       Medical Decision Making  Patient was signed out to me pending workup.  His repeat troponin is 41, his first was 40.  He is still asymptomatic.  I feel at this time he can be discharged to home.  He will need to follow-up with his PCP and be compliant with his medications.        Final diagnoses:   [R00.2] Palpitations           Procedure  Procedures    Ronny Vo MD   
None

## 2025-03-03 NOTE — ED PROVIDER NOTE - PHYSICAL EXAMINATION
Medication: glucose sensors  Approved for: 1 year supply  Confirmed dosage and directions, epic system has approved, filled per protocols.    Thank you [facility] Staff  
CONSTITUTIONAL: in NAD  SKIN: diaphoretic  HEAD: NCAT  EYES: EOMI, PERRLA, no scleral icterus, conjunctiva pink  ENT: normal pharynx with no erythema or exudates  NECK: Supple; non tender. Full ROM.  CARD: tachy, no murmurs.  RESP: clear to ausculation b/l. No crackles or wheezing.  ABD: soft, non-tender, non-distended, no rebound or guarding. no CVA tenderness.  EXT: Full ROM.  NEURO: normal motor. normal sensory.  PSYCH: Cooperative, appropriate.

## 2025-04-11 NOTE — PATIENT PROFILE ADULT - FUNCTIONAL SCREEN CURRENT LEVEL: BATHING, MLM
0 = independent
FAMILY HISTORY:  Family history of cardiac disorder, father and mother  Family history of DVT  FHx: congenital heart disease, son    Father  Still living? Unknown  FH: prostate cancer, Age at diagnosis: Age Unknown    Mother  Still living? Unknown  FH: breast cancer, Age at diagnosis: Age Unknown  FH: hypertension, Age at diagnosis: Age Unknown    Sibling  Still living? Yes, Estimated age: 51-60  FH: brain cancer, Age at diagnosis: Age Unknown